# Patient Record
Sex: FEMALE | Race: WHITE | NOT HISPANIC OR LATINO | Employment: OTHER | URBAN - METROPOLITAN AREA
[De-identification: names, ages, dates, MRNs, and addresses within clinical notes are randomized per-mention and may not be internally consistent; named-entity substitution may affect disease eponyms.]

---

## 2017-01-10 RX ORDER — ESTRADIOL 1 MG/1
TABLET ORAL
Qty: 90 TABLET | Refills: 0 | Status: SHIPPED | OUTPATIENT
Start: 2017-01-10 | End: 2017-04-06 | Stop reason: SDUPTHER

## 2017-01-17 RX ORDER — DICYCLOMINE HYDROCHLORIDE 20 MG/1
TABLET ORAL
Qty: 120 TABLET | Refills: 1 | Status: SHIPPED | OUTPATIENT
Start: 2017-01-17 | End: 2017-03-21 | Stop reason: SDUPTHER

## 2017-02-10 RX ORDER — ALPRAZOLAM 1 MG/1
TABLET ORAL
Qty: 90 TABLET | Refills: 5 | Status: SHIPPED | OUTPATIENT
Start: 2017-02-10 | End: 2017-08-09 | Stop reason: SDUPTHER

## 2017-02-25 RX ORDER — MONTELUKAST SODIUM 10 MG/1
TABLET ORAL
Qty: 90 TABLET | Refills: 3 | Status: SHIPPED | OUTPATIENT
Start: 2017-02-25 | End: 2018-02-23 | Stop reason: SDUPTHER

## 2017-03-08 RX ORDER — LEVOTHYROXINE SODIUM 125 UG/1
TABLET ORAL
Qty: 90 TABLET | Refills: 3 | Status: SHIPPED | OUTPATIENT
Start: 2017-03-08 | End: 2018-02-23 | Stop reason: SDUPTHER

## 2017-03-10 ENCOUNTER — OFFICE VISIT (OUTPATIENT)
Dept: FAMILY MEDICINE | Facility: CLINIC | Age: 82
End: 2017-03-10
Payer: MEDICARE

## 2017-03-10 VITALS
TEMPERATURE: 99 F | WEIGHT: 262.81 LBS | BODY MASS INDEX: 44.87 KG/M2 | HEIGHT: 64 IN | HEART RATE: 98 BPM | SYSTOLIC BLOOD PRESSURE: 114 MMHG | RESPIRATION RATE: 18 BRPM | DIASTOLIC BLOOD PRESSURE: 76 MMHG | OXYGEN SATURATION: 96 %

## 2017-03-10 DIAGNOSIS — E07.9 THYROID DISEASE: ICD-10-CM

## 2017-03-10 DIAGNOSIS — K21.9 GASTROESOPHAGEAL REFLUX DISEASE, ESOPHAGITIS PRESENCE NOT SPECIFIED: ICD-10-CM

## 2017-03-10 DIAGNOSIS — R60.0 BILATERAL EDEMA OF LOWER EXTREMITY: Primary | ICD-10-CM

## 2017-03-10 PROCEDURE — 1160F RVW MEDS BY RX/DR IN RCRD: CPT | Mod: S$GLB,,, | Performed by: FAMILY MEDICINE

## 2017-03-10 PROCEDURE — 1125F AMNT PAIN NOTED PAIN PRSNT: CPT | Mod: S$GLB,,, | Performed by: FAMILY MEDICINE

## 2017-03-10 PROCEDURE — 1157F ADVNC CARE PLAN IN RCRD: CPT | Mod: S$GLB,,, | Performed by: FAMILY MEDICINE

## 2017-03-10 PROCEDURE — 99499 UNLISTED E&M SERVICE: CPT | Mod: S$GLB,,, | Performed by: FAMILY MEDICINE

## 2017-03-10 PROCEDURE — 1159F MED LIST DOCD IN RCRD: CPT | Mod: S$GLB,,, | Performed by: FAMILY MEDICINE

## 2017-03-10 PROCEDURE — 99213 OFFICE O/P EST LOW 20 MIN: CPT | Mod: S$GLB,,, | Performed by: FAMILY MEDICINE

## 2017-03-10 RX ORDER — FUROSEMIDE 40 MG/1
TABLET ORAL
Qty: 90 TABLET | Refills: 1 | Status: SHIPPED | OUTPATIENT
Start: 2017-03-10 | End: 2018-03-02 | Stop reason: SDUPTHER

## 2017-03-11 PROBLEM — R60.0 BILATERAL EDEMA OF LOWER EXTREMITY: Status: ACTIVE | Noted: 2017-03-11

## 2017-03-11 PROBLEM — K21.9 GASTROESOPHAGEAL REFLUX DISEASE: Status: ACTIVE | Noted: 2017-03-11

## 2017-03-11 NOTE — PROGRESS NOTES
Patient ID: Shilpa Christopher is a 81 y.o. female.    Chief Complaint: Foot Swelling (swelling in feet)    HPI      Shilpa Christopher is a 81 y.o. female here bc swelling in her feet bilaterally.  No co of sob or chest pain.  Pt has been cutting down on fluid intake as we discussed at last visit.  She states that her legs are more edematous in the morning than in the evening.  Patient with no cough cold or congestion.  No PND or orthopnea.  Complains that pain in her legs bilaterally with being touched.    Reflux stable as well as thyroid disease          Review of Symptoms    Constitutional  some decrease in activity, No chills /fever   Resp  Neg hemoptysis, stridor, choking  CVS  Neg chest pain, palpitations    Physical Exam    Constitutional:  Oriented to person, place, and time.appears well-developed and well-nourished.  No distress.      HENT  Head: Normocephalic and atraumatic  Right Ear: External ear normal.   Left Ear: External ear normal.   Nose: External nose normal.   Mouth:  Moist mucus membranes.    Eyes:  Conjunctivae are normal. Right eye exhibits no discharge.  Left eye exhibits no discharge. No scleral icterus.  No periorbital edema    Cardiovascular:  Regular rate, Regular rhythm     No extrasystole  Normal S1-S2      Pulmonary/Chest:   Crackles at the base of the lungs bilaterally.    Musculoskeletal:    Edema bilateral legs tender to palpation 3+   No obvious deformity No waisting       Neurological:  Alert and oriented to person, place, and time.   Coordination normal.     Skin:   Skin is warm and dry.  No diaphoresis.   No rash noted.     Psychiatric: Normal mood and affect. Behavior is normal.  Judgment and thought content normal.       Assessment / Plan:      ICD-10-CM ICD-9-CM   1. Bilateral edema of lower extremity R60.0 782.3   2. Gastroesophageal reflux disease, esophagitis presence not specified K21.9 530.81   3. Thyroid disease E07.9 246.9     Bilateral edema of lower  extremity    Gastroesophageal reflux disease, esophagitis presence not specified    Thyroid disease    Other orders  -     furosemide (LASIX) 40 MG tablet; One po up to two times a day ok to take two in am with fluid gain  Dispense: 90 tablet; Refill: 1    Reduce sodium intake-increase Lasix to 80 mg in a.m. 40 mg in the p.m. for 1 week then go back to 40 mg twice a day

## 2017-03-14 RX ORDER — ALBUTEROL SULFATE 90 UG/1
AEROSOL, METERED RESPIRATORY (INHALATION)
Qty: 1 EACH | Refills: 11 | Status: SHIPPED | OUTPATIENT
Start: 2017-03-14 | End: 2017-08-09 | Stop reason: SDUPTHER

## 2017-03-21 RX ORDER — DICYCLOMINE HYDROCHLORIDE 20 MG/1
TABLET ORAL
Qty: 120 TABLET | Refills: 1 | Status: SHIPPED | OUTPATIENT
Start: 2017-03-21 | End: 2017-05-15 | Stop reason: SDUPTHER

## 2017-04-09 RX ORDER — ESTRADIOL 1 MG/1
TABLET ORAL
Qty: 90 TABLET | Refills: 0 | Status: SHIPPED | OUTPATIENT
Start: 2017-04-09 | End: 2017-07-07 | Stop reason: SDUPTHER

## 2017-05-08 RX ORDER — MECLIZINE HYDROCHLORIDE 25 MG/1
TABLET ORAL
Qty: 60 TABLET | Refills: 1 | Status: SHIPPED | OUTPATIENT
Start: 2017-05-08 | End: 2018-08-07 | Stop reason: SDUPTHER

## 2017-05-15 RX ORDER — DICYCLOMINE HYDROCHLORIDE 20 MG/1
TABLET ORAL
Qty: 120 TABLET | Refills: 1 | Status: SHIPPED | OUTPATIENT
Start: 2017-05-15 | End: 2017-07-18 | Stop reason: SDUPTHER

## 2017-05-25 RX ORDER — OMEPRAZOLE 20 MG/1
CAPSULE, DELAYED RELEASE ORAL
Qty: 90 CAPSULE | Refills: 3 | Status: SHIPPED | OUTPATIENT
Start: 2017-05-25 | End: 2018-05-25 | Stop reason: SDUPTHER

## 2017-06-27 RX ORDER — FLUTICASONE PROPIONATE AND SALMETEROL 50; 250 UG/1; UG/1
POWDER RESPIRATORY (INHALATION)
Qty: 1 EACH | Refills: 12 | Status: SHIPPED | OUTPATIENT
Start: 2017-06-27 | End: 2018-07-05 | Stop reason: SDUPTHER

## 2017-07-09 RX ORDER — ESTRADIOL 1 MG/1
TABLET ORAL
Qty: 90 TABLET | Refills: 0 | Status: SHIPPED | OUTPATIENT
Start: 2017-07-09 | End: 2017-12-19

## 2017-07-18 RX ORDER — DICYCLOMINE HYDROCHLORIDE 20 MG/1
TABLET ORAL
Qty: 120 TABLET | Refills: 1 | Status: SHIPPED | OUTPATIENT
Start: 2017-07-18 | End: 2017-09-13 | Stop reason: SDUPTHER

## 2017-08-09 ENCOUNTER — OFFICE VISIT (OUTPATIENT)
Dept: FAMILY MEDICINE | Facility: CLINIC | Age: 82
End: 2017-08-09
Payer: MEDICARE

## 2017-08-09 ENCOUNTER — TELEPHONE (OUTPATIENT)
Dept: FAMILY MEDICINE | Facility: CLINIC | Age: 82
End: 2017-08-09

## 2017-08-09 VITALS
HEART RATE: 95 BPM | SYSTOLIC BLOOD PRESSURE: 128 MMHG | OXYGEN SATURATION: 97 % | HEIGHT: 64 IN | TEMPERATURE: 98 F | BODY MASS INDEX: 43.99 KG/M2 | WEIGHT: 257.69 LBS | DIASTOLIC BLOOD PRESSURE: 80 MMHG

## 2017-08-09 DIAGNOSIS — L03.116 CELLULITIS OF LEFT LEG: Primary | ICD-10-CM

## 2017-08-09 PROCEDURE — 1159F MED LIST DOCD IN RCRD: CPT | Mod: S$GLB,,, | Performed by: FAMILY MEDICINE

## 2017-08-09 PROCEDURE — 1126F AMNT PAIN NOTED NONE PRSNT: CPT | Mod: S$GLB,,, | Performed by: FAMILY MEDICINE

## 2017-08-09 PROCEDURE — 99213 OFFICE O/P EST LOW 20 MIN: CPT | Mod: S$GLB,,, | Performed by: FAMILY MEDICINE

## 2017-08-09 PROCEDURE — 3008F BODY MASS INDEX DOCD: CPT | Mod: S$GLB,,, | Performed by: FAMILY MEDICINE

## 2017-08-09 RX ORDER — SULFAMETHOXAZOLE AND TRIMETHOPRIM 800; 160 MG/1; MG/1
1 TABLET ORAL 2 TIMES DAILY
Qty: 20 TABLET | Refills: 0 | Status: SHIPPED | OUTPATIENT
Start: 2017-08-09 | End: 2017-08-21

## 2017-08-09 RX ORDER — ALPRAZOLAM 1 MG/1
TABLET ORAL
Qty: 90 TABLET | Refills: 5 | Status: SHIPPED | OUTPATIENT
Start: 2017-08-09 | End: 2018-03-02 | Stop reason: SDUPTHER

## 2017-08-09 NOTE — TELEPHONE ENCOUNTER
----- Message from Sadie Fagan sent at 8/9/2017  8:58 AM CDT -----  Contact: Pt [phone]  Patient requesting appointment with Dr. Hernandez as soon as possible . Patient states she has a itchy rash on her leg. Patient states it itches so bad, she can't sleep at night.  Please advise

## 2017-08-09 NOTE — TELEPHONE ENCOUNTER
----- Message from Georgina Garcia sent at 8/9/2017  2:43 PM CDT -----  Contact: Zac @ [facility]  Patient is requesting a medication refill. Patient at [facility]; they didn't recvd the Rx as discussed at appt today    RX name: alprazolam (XANAX) 1 MG tablet  Strength:   Quantity: 90 day with refills   Directions:TAKE 1 TABLET BY MOUTH THREE TIMES A DAY      Pharmacy name: [email or web address]

## 2017-08-09 NOTE — TELEPHONE ENCOUNTER
ms esquivel   Already has an appt with dr harris   On Friday august 11 at 840    Patient says the Rash has been off and on since her last visit  She started taking lasix since her last visit with you.  She took BID yesterday - she said she does not have any fluid build up. She is using the bathroom a lot when she takes the lasix  She said both legs are affected, one more then the other.  One leg is hard and red and itching and warm to touch  The other is pink and getting worse  Not oozing, no fever.  She tried benadryl cream, alcohol.    I think she needs to be seen today  Please advise

## 2017-08-14 NOTE — PROGRESS NOTES
Patient ID: Shilpa Christopher is a 81 y.o. female.    Chief Complaint: Leg Swelling (left leg swelling and redness)    HPI       Shilpa Christopher is a 81 y.o. female here because of left leg swelling with redness mild pain itching and irritation.  She has a history of lymphedema and follow-up past few days has had size slightly with erythema.  No fever chills nausea vomiting diarrhea.  Use of peroxide and some multiple skin products has helped relieve some of the itching.      Review of Symptoms    Constitutional  slight change in activity due to pain and swelling, No chills fever   Resp  Neg hemoptysis, stridor, choking  CVS  Neg chest pain, palpitations    Physical Exam    Constitutional:   Oriented to person, place, and time.appears well-developed and well-nourished.   No distress.     HENT  Head: Normocephalic and atraumatic  Right Ear: External ear normal.   Left Ear: External ear normal.   Nose: External nose normal.   Mouth: Moist mucous membranes    Eyes:   Conjunctivae are normal. Right eye exhibits no discharge. Left eye exhibits no discharge. No scleral icterus. No periorbital edema    Musculoskeletal:  No edema. No obvious deformity No waisting     Neurological:  Alert and oriented to person, place, and time. Coordination normal.     Skin:   Left leg slightly swollen mild erythema anterior approximately 14 cm x 6 cm slightly tender anterior.  No diaphoresis.       Psychiatric: Normal mood and affect. Behavior is normal. Judgment and thought content normal.       Assessment / Plan:      ICD-10-CM ICD-9-CM   1. Cellulitis of left leg L03.116 682.6     Cellulitis of left leg    Other orders  -     sulfamethoxazole-trimethoprim 800-160mg (BACTRIM DS) 800-160 mg Tab; Take 1 tablet by mouth 2 (two) times daily.  Dispense: 20 tablet; Refill: 0     antibiotics elevate-come to the office for reevaluation in approximately 2 days

## 2017-08-15 ENCOUNTER — OFFICE VISIT (OUTPATIENT)
Dept: FAMILY MEDICINE | Facility: CLINIC | Age: 82
End: 2017-08-15
Payer: MEDICARE

## 2017-08-15 ENCOUNTER — TELEPHONE (OUTPATIENT)
Dept: FAMILY MEDICINE | Facility: CLINIC | Age: 82
End: 2017-08-15

## 2017-08-15 VITALS
WEIGHT: 259.06 LBS | BODY MASS INDEX: 44.23 KG/M2 | HEIGHT: 64 IN | TEMPERATURE: 99 F | OXYGEN SATURATION: 96 % | DIASTOLIC BLOOD PRESSURE: 88 MMHG | SYSTOLIC BLOOD PRESSURE: 136 MMHG | HEART RATE: 99 BPM

## 2017-08-15 DIAGNOSIS — L03.116 CELLULITIS OF LEFT LOWER EXTREMITY: Primary | ICD-10-CM

## 2017-08-15 PROCEDURE — 1159F MED LIST DOCD IN RCRD: CPT | Mod: S$GLB,,, | Performed by: FAMILY MEDICINE

## 2017-08-15 PROCEDURE — 99213 OFFICE O/P EST LOW 20 MIN: CPT | Mod: 25,S$GLB,, | Performed by: FAMILY MEDICINE

## 2017-08-15 PROCEDURE — 96372 THER/PROPH/DIAG INJ SC/IM: CPT | Mod: S$GLB,,, | Performed by: FAMILY MEDICINE

## 2017-08-15 PROCEDURE — 1125F AMNT PAIN NOTED PAIN PRSNT: CPT | Mod: S$GLB,,, | Performed by: FAMILY MEDICINE

## 2017-08-15 PROCEDURE — 3008F BODY MASS INDEX DOCD: CPT | Mod: S$GLB,,, | Performed by: FAMILY MEDICINE

## 2017-08-15 RX ORDER — CLINDAMYCIN HYDROCHLORIDE 300 MG/1
300 CAPSULE ORAL 3 TIMES DAILY
Qty: 21 CAPSULE | Refills: 0 | Status: SHIPPED | OUTPATIENT
Start: 2017-08-15 | End: 2017-08-22 | Stop reason: SDUPTHER

## 2017-08-15 RX ORDER — GENTAMICIN SULFATE 40 MG/ML
80 INJECTION, SOLUTION INTRAMUSCULAR; INTRAVENOUS
Status: COMPLETED | OUTPATIENT
Start: 2017-08-15 | End: 2017-08-15

## 2017-08-15 RX ADMIN — GENTAMICIN SULFATE 80 MG: 40 INJECTION, SOLUTION INTRAMUSCULAR; INTRAVENOUS at 04:08

## 2017-08-15 NOTE — TELEPHONE ENCOUNTER
----- Message from Sadie Fagan sent at 8/15/2017 10:29 AM CDT -----  Contact: Pt [phone]  Patient requesting appointment today with Dr. Kahn.      Symptoms: Leg pain due to cellulitis     How long: 3 days

## 2017-08-15 NOTE — PROGRESS NOTES
Patient ID: Shilpa Christopher is a 81 y.o. female.    Chief Complaint: Leg Problem (left leg pain, redness, and itching)    HPI      Shilpa Christopher is a 81 y.o. female continues to clinic complaining of left leg pain redness and feeling of itching.  Was seen in the clinic for erythematous slightly edematous left leg with above symptoms.  Not getting better on antibiotics and elevating.  No fever chills nausea vomiting diarrhea            Review of Symptoms    Constitutional  Neg activity change, No chills /fever   Resp  Neg hemoptysis, stridor, choking  CVS  Neg chest pain, palpitations    Physical Exam    Constitutional:  Oriented to person, place, and time.appears well-developed and well-nourished.  No distress.      HENT  Head: Normocephalic and atraumatic  Right Ear: External ear normal.   Left Ear: External ear normal.   Nose: External nose normal.   Mouth:  Moist mucus membranes.    Eyes:  Conjunctivae are normal. Right eye exhibits no discharge.  Left eye exhibits no discharge. No scleral icterus.  No periorbital edema             Neurological:  Alert and oriented to person, place, and time.   Coordination normal.     Skin:   Skin is warm and dry.  No diaphoresis.   No rash noted.  Except left lower extremity  Left leg anterior shin erythematous slightly tender blanchable to touch.    Psychiatric: Normal mood and affect. Behavior is normal.  Judgment and thought content normal.       Assessment / Plan:    No diagnosis found.  There are no diagnoses linked to this encounter.

## 2017-08-16 ENCOUNTER — TELEPHONE (OUTPATIENT)
Dept: FAMILY MEDICINE | Facility: CLINIC | Age: 82
End: 2017-08-16

## 2017-08-16 RX ORDER — DOXEPIN HYDROCHLORIDE 10 MG/1
CAPSULE ORAL
Qty: 30 CAPSULE | Refills: 0 | Status: SHIPPED | OUTPATIENT
Start: 2017-08-16 | End: 2019-03-06

## 2017-08-16 NOTE — TELEPHONE ENCOUNTER
"----- Message from Georgina Garcia sent at 8/16/2017 10:56 AM CDT -----  Contact: daughter-Mack  Daughter calling on mother's behalf  Patient experiencing "severe" itching in left leg due to cellulitis. Wants to know what to do? Can something be called in? Patient was just seen on yesterday & doesn't know if mother mentioned cellulitis to doctor at appointment  "

## 2017-08-16 NOTE — TELEPHONE ENCOUNTER
See message below  I spoke with the daughter  She was woken up this am with the itching  Ankle to the knee  She applied benadryl cream  And is elevating it - soaking it in ice which helps the itching  I advised not to soak it in ice all day   I told her she can do it for 20 min at a time a couple times a day  She said she cant take benadryl bc she takes xanax TID and it did not agree with her last time she took it    The itching is only on the leg no where else    Please advise

## 2017-08-21 ENCOUNTER — OFFICE VISIT (OUTPATIENT)
Dept: FAMILY MEDICINE | Facility: CLINIC | Age: 82
End: 2017-08-21
Payer: MEDICARE

## 2017-08-21 VITALS
HEIGHT: 64 IN | BODY MASS INDEX: 44.18 KG/M2 | HEART RATE: 107 BPM | OXYGEN SATURATION: 96 % | WEIGHT: 258.81 LBS | SYSTOLIC BLOOD PRESSURE: 148 MMHG | DIASTOLIC BLOOD PRESSURE: 70 MMHG

## 2017-08-21 DIAGNOSIS — L03.90 CELLULITIS, UNSPECIFIED CELLULITIS SITE: Primary | ICD-10-CM

## 2017-08-21 PROCEDURE — 1159F MED LIST DOCD IN RCRD: CPT | Mod: S$GLB,,, | Performed by: FAMILY MEDICINE

## 2017-08-21 PROCEDURE — 1125F AMNT PAIN NOTED PAIN PRSNT: CPT | Mod: S$GLB,,, | Performed by: FAMILY MEDICINE

## 2017-08-21 PROCEDURE — 3008F BODY MASS INDEX DOCD: CPT | Mod: S$GLB,,, | Performed by: FAMILY MEDICINE

## 2017-08-21 PROCEDURE — 99213 OFFICE O/P EST LOW 20 MIN: CPT | Mod: S$GLB,,, | Performed by: FAMILY MEDICINE

## 2017-08-22 RX ORDER — CLINDAMYCIN HYDROCHLORIDE 300 MG/1
CAPSULE ORAL
Qty: 21 CAPSULE | Refills: 0 | Status: SHIPPED | OUTPATIENT
Start: 2017-08-22 | End: 2017-09-27

## 2017-08-24 ENCOUNTER — TELEPHONE (OUTPATIENT)
Dept: FAMILY MEDICINE | Facility: CLINIC | Age: 82
End: 2017-08-24

## 2017-08-25 ENCOUNTER — TELEPHONE (OUTPATIENT)
Dept: FAMILY MEDICINE | Facility: CLINIC | Age: 82
End: 2017-08-25

## 2017-08-25 RX ORDER — PREDNISONE 5 MG/1
TABLET ORAL
Qty: 20 TABLET | Refills: 0 | Status: SHIPPED | OUTPATIENT
Start: 2017-08-25 | End: 2017-12-19

## 2017-08-25 NOTE — TELEPHONE ENCOUNTER
I discussed care with her and her daughter    Probably some type of inflammation/phlebitis-many the medicines she cannot tolerate elevate topical preparations fluid pills

## 2017-08-25 NOTE — TELEPHONE ENCOUNTER
----- Message from Georgina Garcia sent at 8/25/2017 10:48 AM CDT -----  Contact: son Benedict  Patient would like to speak with Dr Lakhani  Started medication prescribed this morning. Patient blood pressure 180/70 ; dry mouth; dizzy & lethargic

## 2017-08-28 NOTE — PROGRESS NOTES
Patient ID: Shilpa Christopher is a 81 y.o. female.    Chief Complaint: Recurrent Skin Infections (itching)    HPI       Shilpa Christopher is a 81 y.o. female co of pain and redness of left leg.  Not as bad as it has been. Itching is controled with  otc preparations.        Review of Symptoms    Constitutional  No change in activity, No chills fever   Resp  Neg hemoptysis, stridor, choking  CVS  Neg chest pain, palpitations    Physical Exam    Constitutional:   Oriented to person, place, and time.appears well-developed and well-nourished.   No distress.     HENT  Head: Normocephalic and atraumatic  Right Ear: External ear normal.   Left Ear: External ear normal.   Nose: External nose normal.   Mouth: Moist mucous membranes    Eyes:   Conjunctivae are normal. Right eye exhibits no discharge. Left eye exhibits no discharge. No scleral icterus. No periorbital edema    Musculoskeletal:  No edema. No obvious deformity No waisting     Neurological:  Alert and oriented to person, place, and time. Coordination normal.     Skin:   Skin is warm and dry. Much less inflammation and erythema No diaphoresis.   No rash noted.     Psychiatric: Normal mood and affect. Behavior is normal. Judgment and thought content normal.       Assessment / Plan:      ICD-10-CM ICD-9-CM   1. Cellulitis, unspecified cellulitis site L03.90 682.9     Cellulitis, unspecified cellulitis site    Other orders  -     predniSONE (DELTASONE) 5 MG tablet; One or two daily for inflamation  Dispense: 20 tablet; Refill: 0      May be phlebitis   Try above if able and needed.

## 2017-09-07 RX ORDER — ALPRAZOLAM 1 MG/1
TABLET ORAL
Qty: 90 TABLET | Refills: 5 | Status: SHIPPED | OUTPATIENT
Start: 2017-09-07 | End: 2017-12-19

## 2017-09-13 RX ORDER — DICYCLOMINE HYDROCHLORIDE 20 MG/1
TABLET ORAL
Qty: 120 TABLET | Refills: 1 | Status: SHIPPED | OUTPATIENT
Start: 2017-09-13 | End: 2017-11-14 | Stop reason: SDUPTHER

## 2017-09-27 ENCOUNTER — TELEPHONE (OUTPATIENT)
Dept: FAMILY MEDICINE | Facility: CLINIC | Age: 82
End: 2017-09-27

## 2017-09-27 RX ORDER — SULFAMETHOXAZOLE AND TRIMETHOPRIM 800; 160 MG/1; MG/1
1 TABLET ORAL 2 TIMES DAILY
Qty: 10 TABLET | Refills: 0 | Status: SHIPPED | OUTPATIENT
Start: 2017-09-27 | End: 2017-12-19

## 2017-09-27 RX ORDER — PHENAZOPYRIDINE HYDROCHLORIDE 200 MG/1
200 TABLET, FILM COATED ORAL 3 TIMES DAILY PRN
Qty: 15 TABLET | Refills: 0 | Status: SHIPPED | OUTPATIENT
Start: 2017-09-27 | End: 2017-10-07

## 2017-09-27 NOTE — TELEPHONE ENCOUNTER
----- Message from Georgina Garcia sent at 9/27/2017  9:56 AM CDT -----  Patient said she is to old to be going to a doctor. She knows what she has. UTI  Symptom of frequent urination with little urine. Painful urination  Patient request Rx for Bactrum & Peridium    [facility]

## 2017-10-09 RX ORDER — IPRATROPIUM BROMIDE 17 UG/1
AEROSOL, METERED RESPIRATORY (INHALATION)
Qty: 38.7 G | Refills: 3 | Status: SHIPPED | OUTPATIENT
Start: 2017-10-09 | End: 2018-07-05

## 2017-11-14 RX ORDER — DICYCLOMINE HYDROCHLORIDE 20 MG/1
TABLET ORAL
Qty: 120 TABLET | Refills: 1 | Status: SHIPPED | OUTPATIENT
Start: 2017-11-14 | End: 2018-01-15 | Stop reason: SDUPTHER

## 2017-12-19 ENCOUNTER — OFFICE VISIT (OUTPATIENT)
Dept: FAMILY MEDICINE | Facility: CLINIC | Age: 82
End: 2017-12-19
Payer: MEDICARE

## 2017-12-19 VITALS
WEIGHT: 258.81 LBS | OXYGEN SATURATION: 94 % | HEIGHT: 64 IN | BODY MASS INDEX: 44.18 KG/M2 | TEMPERATURE: 98 F | HEART RATE: 101 BPM | SYSTOLIC BLOOD PRESSURE: 132 MMHG | DIASTOLIC BLOOD PRESSURE: 66 MMHG

## 2017-12-19 DIAGNOSIS — J40 BRONCHITIS: Primary | ICD-10-CM

## 2017-12-19 DIAGNOSIS — J44.0 COPD (CHRONIC OBSTRUCTIVE PULMONARY DISEASE) WITH ACUTE BRONCHITIS: ICD-10-CM

## 2017-12-19 DIAGNOSIS — J20.9 COPD (CHRONIC OBSTRUCTIVE PULMONARY DISEASE) WITH ACUTE BRONCHITIS: ICD-10-CM

## 2017-12-19 PROCEDURE — 99499 UNLISTED E&M SERVICE: CPT | Mod: S$GLB,,, | Performed by: FAMILY MEDICINE

## 2017-12-19 PROCEDURE — 99214 OFFICE O/P EST MOD 30 MIN: CPT | Mod: S$GLB,,, | Performed by: FAMILY MEDICINE

## 2017-12-19 RX ORDER — SULFAMETHOXAZOLE AND TRIMETHOPRIM 800; 160 MG/1; MG/1
1 TABLET ORAL 2 TIMES DAILY
Qty: 14 TABLET | Refills: 0 | Status: SHIPPED | OUTPATIENT
Start: 2017-12-19 | End: 2017-12-26

## 2017-12-19 RX ORDER — IPRATROPIUM BROMIDE 0.5 MG/2.5ML
500 SOLUTION RESPIRATORY (INHALATION) 4 TIMES DAILY
Qty: 120 VIAL | Refills: 1 | Status: SHIPPED | OUTPATIENT
Start: 2017-12-19 | End: 2018-04-02 | Stop reason: SDUPTHER

## 2017-12-19 RX ORDER — ALBUTEROL SULFATE 0.83 MG/ML
2.5 SOLUTION RESPIRATORY (INHALATION) EVERY 6 HOURS PRN
Qty: 120 EACH | Refills: 2 | Status: SHIPPED | OUTPATIENT
Start: 2017-12-19 | End: 2018-11-26 | Stop reason: SDUPTHER

## 2017-12-20 RX ORDER — GENTAMICIN SULFATE 40 MG/ML
80 INJECTION, SOLUTION INTRAMUSCULAR; INTRAVENOUS
Status: CANCELLED | OUTPATIENT
Start: 2017-12-19

## 2017-12-22 ENCOUNTER — TELEPHONE (OUTPATIENT)
Dept: FAMILY MEDICINE | Facility: CLINIC | Age: 82
End: 2017-12-22

## 2017-12-22 PROCEDURE — 96372 THER/PROPH/DIAG INJ SC/IM: CPT | Mod: S$GLB,,, | Performed by: FAMILY MEDICINE

## 2017-12-22 RX ORDER — GENTAMICIN SULFATE 40 MG/ML
80 INJECTION, SOLUTION INTRAMUSCULAR; INTRAVENOUS
Status: COMPLETED | OUTPATIENT
Start: 2017-12-19 | End: 2017-12-22

## 2017-12-22 RX ADMIN — GENTAMICIN SULFATE 80 MG: 40 INJECTION, SOLUTION INTRAMUSCULAR; INTRAVENOUS at 12:12

## 2017-12-22 NOTE — TELEPHONE ENCOUNTER
I spoke with the pt    No fever today - she ran fever for 2 days but none today  Coughing a lot - worse at night - c/o not resting  Not taking any rx cough meds  Still taking abx    She has   Safetussin DM OTC - at home   But her daughter said she cant take that with all her other meds      Please advise  Please call

## 2017-12-22 NOTE — TELEPHONE ENCOUNTER
----- Message from Georgina Garcia sent at 12/22/2017  1:30 PM CST -----  Patient saw doctor this week. Says she is not better. Feels like bronchitis

## 2017-12-29 NOTE — PROGRESS NOTES
Patient ID: Shilpa Christopher is a 82 y.o. female.    Chief Complaint: Cough; Nasal Congestion; and Fever    HPI       Shilpa Christopher is a 82 y.o. female few day history of nasal congestion low-grade fever and wheezing.  She is using nebulizer treatments approximately 4 times a day.  Helping slightly.  No nausea vomiting or diarrhea at this point      Review of Symptoms    Constitutional  some activity change due to illness, slight chills and low-grade increased temperature  Resp  Neg hemoptysis, stridor, choking  CVS  Neg chest pain, palpitations    Physical Exam    Constitutional:   Oriented to person, place, and time.appears well-developed and well-nourished.   No distress.     HENT:   Head: Normocephalic and atraumatic.     Right Ear: Tympanic membrane, external ear and ear canal normal     Left Ear: Tympanic membrane, external ear and ear canal normal    Nose: External Normal. Normal turbinates, No rhinorrhea     Mouth/Throat: Uvula is midline, oropharynx is clear and moist and mucous membranes are normal.     Neck: supple no anterior cervical adenopathy    Carotid artery:  Bruit    NEG []   POS[]    Eyes:   Conjunctivae are normal. Right eye exhibits no discharge. Left eye exhibits no discharge. No scleral icterus. No periorbital edema       Cardiovascular:  Regular rate, Regular rhythm     No extrasystole  Normal S1-S2    Pulmonary/Chest:   Normal effort with some respiratory wheezing and occasional rhonchi.      Musculoskeletal:  No edema. No obvious deformity No wasting     Neurological:   Alert and oriented to person, place, and time. Coordination normal.     Skin:   Skin is warm and dry.   No diaphoresis.   No rash noted.    Psychiatric: Normal mood and affect. Behavior is normal. Judgment and thought content normal.       Assessment / Plan:      ICD-10-CM ICD-9-CM   1. Bronchitis J40 490   2. COPD (chronic obstructive pulmonary disease) with acute bronchitis J44.0 491.22    J20.9      Bronchitis  -      gentamicin injection 80 mg; Inject 80 mg into the muscle one time.    COPD (chronic obstructive pulmonary disease) with acute bronchitis  -     gentamicin injection 80 mg; Inject 80 mg into the muscle one time.    Other orders  -     albuterol (PROVENTIL) 2.5 mg /3 mL (0.083 %) nebulizer solution; Take 3 mLs (2.5 mg total) by nebulization every 6 (six) hours as needed for Wheezing. Rescue  Dispense: 120 each; Refill: 2  -     ipratropium (ATROVENT) 0.02 % nebulizer solution; Take 2.5 mLs (500 mcg total) by nebulization 4 (four) times daily. Rescue  Dispense: 120 vial; Refill: 1  -     sulfamethoxazole-trimethoprim 800-160mg (BACTRIM DS) 800-160 mg Tab; Take 1 tablet by mouth 2 (two) times daily.  Dispense: 14 tablet; Refill: 0  -     Cancel: gentamicin injection 80 mg; Inject 80 mg into the muscle one time.     she cannot tolerate many of the medicines used for these symptoms

## 2018-01-15 RX ORDER — DICYCLOMINE HYDROCHLORIDE 20 MG/1
TABLET ORAL
Qty: 120 TABLET | Refills: 1 | Status: SHIPPED | OUTPATIENT
Start: 2018-01-15 | End: 2018-03-15 | Stop reason: SDUPTHER

## 2018-02-05 ENCOUNTER — TELEPHONE (OUTPATIENT)
Dept: FAMILY MEDICINE | Facility: CLINIC | Age: 83
End: 2018-02-05

## 2018-02-05 RX ORDER — SULFAMETHOXAZOLE AND TRIMETHOPRIM 800; 160 MG/1; MG/1
1 TABLET ORAL 2 TIMES DAILY
Qty: 14 TABLET | Refills: 0 | Status: SHIPPED | OUTPATIENT
Start: 2018-02-05 | End: 2018-03-02

## 2018-02-05 RX ORDER — PHENAZOPYRIDINE HYDROCHLORIDE 100 MG/1
100 TABLET, FILM COATED ORAL 3 TIMES DAILY PRN
Qty: 30 TABLET | Refills: 0 | Status: SHIPPED | OUTPATIENT
Start: 2018-02-05 | End: 2018-02-15

## 2018-02-05 NOTE — TELEPHONE ENCOUNTER
----- Message from Georgina Garcia sent at 2/5/2018 10:48 AM CST -----  Patient has bladder infection. Started on Saturday. Taking OTC Azo. Has Rx of bactrum but doesn't have any refills. Pt requesting Rx for both Bactrum & Peridum    [facility]

## 2018-02-23 RX ORDER — LEVOTHYROXINE SODIUM 125 UG/1
TABLET ORAL
Qty: 90 TABLET | Refills: 3 | Status: SHIPPED | OUTPATIENT
Start: 2018-02-23 | End: 2019-02-26 | Stop reason: SDUPTHER

## 2018-02-24 RX ORDER — MONTELUKAST SODIUM 10 MG/1
TABLET ORAL
Qty: 90 TABLET | Refills: 3 | Status: SHIPPED | OUTPATIENT
Start: 2018-02-24 | End: 2019-02-13 | Stop reason: SDUPTHER

## 2018-03-02 ENCOUNTER — TELEPHONE (OUTPATIENT)
Dept: FAMILY MEDICINE | Facility: CLINIC | Age: 83
End: 2018-03-02

## 2018-03-02 ENCOUNTER — OFFICE VISIT (OUTPATIENT)
Dept: FAMILY MEDICINE | Facility: CLINIC | Age: 83
End: 2018-03-02
Payer: MEDICARE

## 2018-03-02 VITALS
TEMPERATURE: 99 F | DIASTOLIC BLOOD PRESSURE: 66 MMHG | BODY MASS INDEX: 45.24 KG/M2 | SYSTOLIC BLOOD PRESSURE: 122 MMHG | OXYGEN SATURATION: 93 % | HEIGHT: 64 IN | HEART RATE: 115 BPM | WEIGHT: 265 LBS

## 2018-03-02 DIAGNOSIS — J20.9 COPD (CHRONIC OBSTRUCTIVE PULMONARY DISEASE) WITH ACUTE BRONCHITIS: ICD-10-CM

## 2018-03-02 DIAGNOSIS — J45.909 ASTHMA, UNSPECIFIED ASTHMA SEVERITY, UNSPECIFIED WHETHER COMPLICATED, UNSPECIFIED WHETHER PERSISTENT: ICD-10-CM

## 2018-03-02 DIAGNOSIS — E66.01 MORBIDLY OBESE: ICD-10-CM

## 2018-03-02 DIAGNOSIS — M72.2 PLANTAR FASCIITIS: ICD-10-CM

## 2018-03-02 DIAGNOSIS — J44.0 COPD (CHRONIC OBSTRUCTIVE PULMONARY DISEASE) WITH ACUTE BRONCHITIS: ICD-10-CM

## 2018-03-02 DIAGNOSIS — R60.0 BILATERAL EDEMA OF LOWER EXTREMITY: Primary | ICD-10-CM

## 2018-03-02 PROCEDURE — 99213 OFFICE O/P EST LOW 20 MIN: CPT | Mod: S$GLB,,, | Performed by: FAMILY MEDICINE

## 2018-03-02 PROCEDURE — 99499 UNLISTED E&M SERVICE: CPT | Mod: S$GLB,,, | Performed by: FAMILY MEDICINE

## 2018-03-02 RX ORDER — ALPRAZOLAM 1 MG/1
1 TABLET ORAL 3 TIMES DAILY
Qty: 90 TABLET | Refills: 5 | Status: SHIPPED | OUTPATIENT
Start: 2018-03-02 | End: 2018-08-29 | Stop reason: SDUPTHER

## 2018-03-02 RX ORDER — SULFAMETHOXAZOLE AND TRIMETHOPRIM 800; 160 MG/1; MG/1
1 TABLET ORAL 2 TIMES DAILY
Qty: 14 TABLET | Refills: 0 | Status: SHIPPED | OUTPATIENT
Start: 2018-03-02 | End: 2018-05-08 | Stop reason: ALTCHOICE

## 2018-03-02 RX ORDER — FUROSEMIDE 40 MG/1
TABLET ORAL
Qty: 90 TABLET | Refills: 1 | Status: SHIPPED | OUTPATIENT
Start: 2018-03-02 | End: 2018-10-31 | Stop reason: SDUPTHER

## 2018-03-02 NOTE — TELEPHONE ENCOUNTER
----- Message from Taylor Pike sent at 3/2/2018  8:59 AM CST -----  Contact: the pt  Patient would like to be seen today. Pt declined appt with Ana.  States this looks serious.  She can be reached at [phone]    Symptoms: Leg swollen/red.  Per the pt, may be cellulitis    How long has patient had these symptoms: Couple days    If unable to be seen , can something be called into patient pharmacy? Really want to see him    Pharmacy name: [facility]    Any drug allergies:

## 2018-03-02 NOTE — PROGRESS NOTES
Subjective:      Patient ID: Shilpa Christopher is a 82 y.o. female.    Chief Complaint: No chief complaint on file.    Legs swollen and red with cellulitis; had this before; still on a fluid pill; took 2 yesterday;, diuressed good; can take bactrim ds; heels hurt;       Review of Systems   Constitutional: Negative.    HENT: Negative.    Respiratory: Positive for cough, shortness of breath and wheezing.    Cardiovascular: Positive for leg swelling.   Gastrointestinal: Negative.    Endocrine: Negative.    Genitourinary: Negative.    Musculoskeletal: Negative.    Skin: Positive for color change.   Psychiatric/Behavioral: Negative.    All other systems reviewed and are negative.    Objective:     Physical Exam   Constitutional: She is oriented to person, place, and time. She appears well-developed and well-nourished.   Obese   HENT:   Head: Normocephalic.   Eyes: Conjunctivae and EOM are normal. Pupils are equal, round, and reactive to light.   Neck: Normal range of motion. Neck supple.   Cardiovascular: Normal rate, regular rhythm and normal heart sounds.    Pulmonary/Chest: She is in respiratory distress. She has wheezes.   Musculoskeletal: Normal range of motion. She exhibits edema and tenderness.   Neurological: She is alert and oriented to person, place, and time. She has normal reflexes.   Skin: Skin is warm and dry. There is erythema.   Psychiatric: She has a normal mood and affect. Her behavior is normal. Judgment and thought content normal.   Nursing note and vitals reviewed.    Assessment:     1. Bilateral edema of lower extremity    2. Asthma, unspecified asthma severity, unspecified whether complicated, unspecified whether persistent    3. Plantar fasciitis    4. Morbidly obese    5. COPD (chronic obstructive pulmonary disease) with acute bronchitis      Plan:     New Prescriptions    No medications on file     Discontinued Medications    No medications on file     Modified Medications    Modified Medication  Previous Medication    ALPRAZOLAM (XANAX) 1 MG TABLET alprazolam (XANAX) 1 MG tablet       Take 1 tablet (1 mg total) by mouth 3 (three) times daily.    TAKE 1 TABLET BY MOUTH THREE TIMES A DAY    FUROSEMIDE (LASIX) 40 MG TABLET furosemide (LASIX) 40 MG tablet       One po up to two times a day ok to take two in am with fluid gain    One po up to two times a day ok to take two in am with fluid gain    SULFAMETHOXAZOLE-TRIMETHOPRIM 800-160MG (BACTRIM DS) 800-160 MG TAB sulfamethoxazole-trimethoprim 800-160mg (BACTRIM DS) 800-160 mg Tab       Take 1 tablet by mouth 2 (two) times daily.    Take 1 tablet by mouth 2 (two) times daily.       Bilateral edema of lower extremity  Comments:  Compression stockings lose weight and avoid salt  Orders:  -     COMPRESSION STOCKINGS    Asthma, unspecified asthma severity, unspecified whether complicated, unspecified whether persistent  Comments:  Use inhalers    Plantar fasciitis  Comments:  Stretch    Morbidly obese  Comments:  Weight reduction diet  Orders:  -     Lipid panel; Future  -     DXA Bone Density Spine And Hip; Future; Expected date: 03/11/2018    COPD (chronic obstructive pulmonary disease) with acute bronchitis  Comments:  Stable use inhalers    Other orders  -     ALPRAZolam (XANAX) 1 MG tablet; Take 1 tablet (1 mg total) by mouth 3 (three) times daily.  Dispense: 90 tablet; Refill: 5  -     furosemide (LASIX) 40 MG tablet; One po up to two times a day ok to take two in am with fluid gain  Dispense: 90 tablet; Refill: 1  -     sulfamethoxazole-trimethoprim 800-160mg (BACTRIM DS) 800-160 mg Tab; Take 1 tablet by mouth 2 (two) times daily.  Dispense: 14 tablet; Refill: 0      Told to stretch for plantar fasciitis

## 2018-03-11 PROBLEM — J20.9 COPD (CHRONIC OBSTRUCTIVE PULMONARY DISEASE) WITH ACUTE BRONCHITIS: Status: ACTIVE | Noted: 2018-03-11

## 2018-03-11 PROBLEM — J44.0 COPD (CHRONIC OBSTRUCTIVE PULMONARY DISEASE) WITH ACUTE BRONCHITIS: Status: ACTIVE | Noted: 2018-03-11

## 2018-03-15 RX ORDER — DICYCLOMINE HYDROCHLORIDE 20 MG/1
TABLET ORAL
Qty: 120 TABLET | Refills: 1 | Status: SHIPPED | OUTPATIENT
Start: 2018-03-15 | End: 2018-05-18 | Stop reason: SDUPTHER

## 2018-04-02 RX ORDER — IPRATROPIUM BROMIDE 0.5 MG/2.5ML
SOLUTION RESPIRATORY (INHALATION)
Qty: 150 ML | Refills: 1 | Status: SHIPPED | OUTPATIENT
Start: 2018-04-02 | End: 2018-07-05 | Stop reason: SDUPTHER

## 2018-04-13 DIAGNOSIS — Z01.419 WELL WOMAN EXAM: Primary | ICD-10-CM

## 2018-04-14 RX ORDER — ALBUTEROL SULFATE 90 UG/1
AEROSOL, METERED RESPIRATORY (INHALATION)
Qty: 1 EACH | Refills: 11 | Status: SHIPPED | OUTPATIENT
Start: 2018-04-14 | End: 2019-03-06

## 2018-05-08 ENCOUNTER — OFFICE VISIT (OUTPATIENT)
Dept: FAMILY MEDICINE | Facility: CLINIC | Age: 83
End: 2018-05-08
Payer: MEDICARE

## 2018-05-08 ENCOUNTER — TELEPHONE (OUTPATIENT)
Dept: FAMILY MEDICINE | Facility: CLINIC | Age: 83
End: 2018-05-08

## 2018-05-08 ENCOUNTER — HOSPITAL ENCOUNTER (OUTPATIENT)
Dept: RADIOLOGY | Facility: HOSPITAL | Age: 83
Discharge: HOME | End: 2018-05-08
Attending: NURSE PRACTITIONER
Payer: MEDICARE

## 2018-05-08 VITALS
OXYGEN SATURATION: 94 % | DIASTOLIC BLOOD PRESSURE: 79 MMHG | SYSTOLIC BLOOD PRESSURE: 149 MMHG | WEIGHT: 258.63 LBS | BODY MASS INDEX: 44.39 KG/M2 | TEMPERATURE: 99 F | RESPIRATION RATE: 15 BRPM | HEART RATE: 124 BPM

## 2018-05-08 DIAGNOSIS — R05.9 COUGH: ICD-10-CM

## 2018-05-08 DIAGNOSIS — J40 BRONCHITIS: Primary | ICD-10-CM

## 2018-05-08 DIAGNOSIS — R50.9 FEVER, UNSPECIFIED FEVER CAUSE: Primary | ICD-10-CM

## 2018-05-08 DIAGNOSIS — R06.02 SOB (SHORTNESS OF BREATH): ICD-10-CM

## 2018-05-08 DIAGNOSIS — R35.0 URINARY FREQUENCY: ICD-10-CM

## 2018-05-08 LAB
BILIRUB SERPL-MCNC: NORMAL MG/DL
BLOOD URINE, POC: NORMAL
COLOR, POC UA: YELLOW
CTP QC/QA: YES
FLUAV AG NPH QL: NEGATIVE
FLUBV AG NPH QL: NEGATIVE
GLUCOSE UR QL STRIP: NORMAL
KETONES UR QL STRIP: NORMAL
LEUKOCYTE ESTERASE URINE, POC: NORMAL
NITRITE, POC UA: NORMAL
PH, POC UA: 7
PROTEIN, POC: NORMAL
SPECIFIC GRAVITY, POC UA: 1
UROBILINOGEN, POC UA: NORMAL

## 2018-05-08 PROCEDURE — 81002 URINALYSIS NONAUTO W/O SCOPE: CPT | Mod: S$GLB,,, | Performed by: NURSE PRACTITIONER

## 2018-05-08 PROCEDURE — 71046 X-RAY EXAM CHEST 2 VIEWS: CPT | Mod: TC,FY,PO

## 2018-05-08 PROCEDURE — 87804 INFLUENZA ASSAY W/OPTIC: CPT | Mod: 59,QW,, | Performed by: NURSE PRACTITIONER

## 2018-05-08 PROCEDURE — 99213 OFFICE O/P EST LOW 20 MIN: CPT | Mod: 25,S$GLB,, | Performed by: NURSE PRACTITIONER

## 2018-05-08 RX ORDER — AZITHROMYCIN 250 MG/1
TABLET, FILM COATED ORAL
Qty: 6 TABLET | Refills: 0 | Status: SHIPPED | OUTPATIENT
Start: 2018-05-08 | End: 2018-05-13

## 2018-05-08 NOTE — PROGRESS NOTES
Subjective:       Patient ID: Shilpa Christopher is a 82 y.o. female.    Chief Complaint: Cough and Fever    Cough   This is a new problem. The current episode started yesterday. The problem has been unchanged. The problem occurs every few minutes. The cough is productive of sputum. Associated symptoms include chills, a fever, shortness of breath and wheezing. Pertinent negatives include no chest pain, ear congestion, ear pain, headaches, heartburn, hemoptysis, myalgias, nasal congestion, postnasal drip, rash, sore throat, sweats or weight loss. Treatments tried: tylenol. The treatment provided no relief. Her past medical history is significant for COPD. There is no history of asthma, bronchiectasis, bronchitis, emphysema, environmental allergies or pneumonia.   Fever    This is a new problem. The current episode started yesterday. The problem occurs constantly. The problem has been unchanged. The maximum temperature noted was 101 to 101.9 F. The temperature was taken using an oral thermometer. Associated symptoms include coughing, diarrhea and wheezing. Pertinent negatives include no abdominal pain, chest pain, congestion, ear pain, headaches, muscle aches, nausea, rash, sleepiness, sore throat, urinary pain or vomiting. Treatments tried: tylenol. The treatment provided no relief.   Risk factors: no contaminated food, no contaminated water, no hx of cancer, no immunosuppression, no occupational exposure, no recent sickness, no recent travel and no sick contacts      Review of Systems   Constitutional: Positive for appetite change, chills, diaphoresis, fatigue and fever. Negative for weight loss.   HENT: Negative for congestion, ear pain, postnasal drip and sore throat.    Respiratory: Positive for cough, shortness of breath and wheezing. Negative for hemoptysis and chest tightness.    Cardiovascular: Negative for chest pain, palpitations and leg swelling.   Gastrointestinal: Positive for diarrhea. Negative for  abdominal pain, heartburn, nausea and vomiting.        Diarrhea yesterday which has subsided today     Genitourinary: Positive for frequency. Negative for dysuria.   Musculoskeletal: Negative for myalgias.   Skin: Negative for rash.   Allergic/Immunologic: Negative for environmental allergies.   Neurological: Negative for headaches.       Objective:      Physical Exam   Constitutional: She is oriented to person, place, and time. She appears well-developed and well-nourished. No distress.   HENT:   Right Ear: Tympanic membrane and external ear normal.   Left Ear: Tympanic membrane and external ear normal.   Nose: No mucosal edema or rhinorrhea. Right sinus exhibits no maxillary sinus tenderness and no frontal sinus tenderness. Left sinus exhibits no maxillary sinus tenderness and no frontal sinus tenderness.   Mouth/Throat: No oropharyngeal exudate, posterior oropharyngeal edema or posterior oropharyngeal erythema.   Cardiovascular: Normal rate, regular rhythm and normal heart sounds.    Pulmonary/Chest: Effort normal. No respiratory distress. She has wheezes in the right upper field, the right middle field, the right lower field, the left upper field, the left middle field and the left lower field.   SOB   Neurological: She is alert and oriented to person, place, and time.   Skin: Skin is warm and dry. She is not diaphoretic.   Psychiatric: She has a normal mood and affect. Her behavior is normal. Judgment and thought content normal.   Vitals reviewed.      Assessment:       1. Fever, unspecified fever cause    2. Urinary frequency    3. SOB (shortness of breath)    4. Cough        Plan:       Fever, unspecified fever cause  -     POCT Influenza A/B  -     CBC auto differential; Future  -     Comprehensive metabolic panel; Future    Urinary frequency  -     POCT URINE DIPSTICK WITHOUT MICROSCOPE    SOB (shortness of breath)  -     X-Ray Chest PA And Lateral; Future    Cough  -     X-Ray Chest PA And Lateral;  Future      Urine clear for infection  Flu swab negative  Xray to rule out pneumonia  Will call today with results

## 2018-05-08 NOTE — TELEPHONE ENCOUNTER
Spoke with patient let her know that her xray was normal most likely bronchitis. Tylenol for the fever and hydrate well call me if anything changes

## 2018-05-18 RX ORDER — DICYCLOMINE HYDROCHLORIDE 20 MG/1
TABLET ORAL
Qty: 120 TABLET | Refills: 1 | Status: SHIPPED | OUTPATIENT
Start: 2018-05-18 | End: 2018-07-15 | Stop reason: SDUPTHER

## 2018-05-25 DIAGNOSIS — E07.9 THYROID DISEASE: ICD-10-CM

## 2018-05-25 DIAGNOSIS — M94.9 DISORDER OF CARTILAGE: ICD-10-CM

## 2018-05-25 DIAGNOSIS — E66.01 MORBIDLY OBESE: ICD-10-CM

## 2018-05-25 DIAGNOSIS — Z00.00 WELLNESS EXAMINATION: Primary | ICD-10-CM

## 2018-05-25 DIAGNOSIS — Z13.820 OSTEOPOROSIS SCREENING: ICD-10-CM

## 2018-05-27 PROBLEM — Z13.820 OSTEOPOROSIS SCREENING: Status: ACTIVE | Noted: 2018-05-27

## 2018-05-27 RX ORDER — OMEPRAZOLE 20 MG/1
CAPSULE, DELAYED RELEASE ORAL
Qty: 90 CAPSULE | Refills: 1 | Status: SHIPPED | OUTPATIENT
Start: 2018-05-27 | End: 2018-10-31 | Stop reason: SDUPTHER

## 2018-05-28 ENCOUNTER — TELEPHONE (OUTPATIENT)
Dept: ADMINISTRATIVE | Facility: HOSPITAL | Age: 83
End: 2018-05-28

## 2018-05-28 NOTE — TELEPHONE ENCOUNTER
Contacted pt to schedule labs and dexa scan.   Scheduled for 05/30/2018 and advised pt to go to pharmacy to have shingles vaccine.   Pt states she would like to have a return phone call regarding most recent labs and chest xray.   Please advise.

## 2018-05-31 ENCOUNTER — HOSPITAL ENCOUNTER (OUTPATIENT)
Dept: RADIOLOGY | Facility: HOSPITAL | Age: 83
Discharge: HOME | End: 2018-05-31
Attending: FAMILY MEDICINE
Payer: MEDICARE

## 2018-05-31 DIAGNOSIS — E66.01 MORBIDLY OBESE: ICD-10-CM

## 2018-05-31 DIAGNOSIS — Z13.820 OSTEOPOROSIS SCREENING: ICD-10-CM

## 2018-05-31 DIAGNOSIS — M94.9 DISORDER OF CARTILAGE: ICD-10-CM

## 2018-05-31 DIAGNOSIS — Z00.00 WELLNESS EXAMINATION: ICD-10-CM

## 2018-05-31 DIAGNOSIS — E07.9 THYROID DISEASE: ICD-10-CM

## 2018-05-31 PROCEDURE — 77080 DXA BONE DENSITY AXIAL: CPT | Mod: TC,PO

## 2018-06-08 ENCOUNTER — OFFICE VISIT (OUTPATIENT)
Dept: FAMILY MEDICINE | Facility: CLINIC | Age: 83
End: 2018-06-08
Payer: MEDICARE

## 2018-06-08 VITALS
BODY MASS INDEX: 43.45 KG/M2 | OXYGEN SATURATION: 94 % | DIASTOLIC BLOOD PRESSURE: 78 MMHG | WEIGHT: 260.81 LBS | HEIGHT: 65 IN | TEMPERATURE: 98 F | SYSTOLIC BLOOD PRESSURE: 132 MMHG | HEART RATE: 100 BPM

## 2018-06-08 DIAGNOSIS — R60.0 LOWER EXTREMITY EDEMA: Primary | ICD-10-CM

## 2018-06-08 PROCEDURE — 99213 OFFICE O/P EST LOW 20 MIN: CPT | Mod: S$GLB,,, | Performed by: FAMILY MEDICINE

## 2018-06-11 NOTE — PROGRESS NOTES
" Patient ID: Shilpa Christopher is a 82 y.o. female.    Chief Complaint: Leg Swelling    HPI       Shilpa Christopher is a 82 y.o. femalecomplaints of right leg S  Swelling I from the ankle and right foot.  Without Fever chills  Without erythema or excruciating pain. Does have some leaking from her skin clear fluid.  This caused her some concern.    Review of Symptoms    Constitutional  No change in activity, No chills fever   Resp  Neg hemoptysis, stridor, choking  CVS  Neg chest pain, palpitations    /78   Pulse 100   Temp 98.2 °F (36.8 °C)   Ht 5' 5" (1.651 m)   Wt 118.3 kg (260 lb 12.8 oz)   SpO2 (!) 94%   BMI 43.40 kg/m²     Physical Exam    Constitutional:   Oriented to person, place, and time.appears well-developed and well-nourished.   No distress.     HENT  Head: Normocephalic and atraumatic  Right Ear: External ear normal.   Left Ear: External ear normal.   Nose: External nose normal.   Mouth: Moist mucous membranes    Eyes:   Conjunctivae are normal. Right eye exhibits no discharge. Left eye exhibits no discharge. No scleral icterus. No periorbital edema    Musculoskeletal:  No edema. No obvious deformity No wasting     Neurological:  Alert and oriented to person, place, and time. Coordination normal.     Skin:   Skin is warm and dry.  No diaphoresis.   No rash noted.     Psychiatric: Normal mood and affect. Behavior is normal. Judgment and thought content normal.       Assessment / Plan:      ICD-10-CM ICD-9-CM   1. Lower extremity edema R60.0 782.3     Lower extremity edema  Comments:  wrap foot and lower leg  elevated        "

## 2018-07-05 RX ORDER — IPRATROPIUM BROMIDE 0.5 MG/2.5ML
SOLUTION RESPIRATORY (INHALATION)
Qty: 150 ML | Refills: 1 | Status: SHIPPED | OUTPATIENT
Start: 2018-07-05 | End: 2018-11-26 | Stop reason: SDUPTHER

## 2018-07-07 RX ORDER — FLUTICASONE PROPIONATE AND SALMETEROL 50; 250 UG/1; UG/1
POWDER RESPIRATORY (INHALATION)
Qty: 1 EACH | Refills: 12 | Status: SHIPPED | OUTPATIENT
Start: 2018-07-07 | End: 2019-07-08 | Stop reason: SDUPTHER

## 2018-07-16 RX ORDER — DICYCLOMINE HYDROCHLORIDE 20 MG/1
TABLET ORAL
Qty: 120 TABLET | Refills: 1 | Status: SHIPPED | OUTPATIENT
Start: 2018-07-16 | End: 2018-09-13 | Stop reason: SDUPTHER

## 2018-08-06 NOTE — TELEPHONE ENCOUNTER
----- Message from Georgina Garcia sent at 8/24/2017 11:18 AM CDT -----  Would like returned call from Dr Lakhani  Says her leg is worse this morning. Thinks the Tylenol may be the cause. Takes about 6 Tylenol daily.  
I spoke with the pt   She iced the legs and itching went away  Feels better now, the problem usually happens at night   She thinks the itching may be from the tylenol  That may have caused liver damage   That may be causing the itching  She has Family history of liver disease  I reassured her that her LFT were drawn on 8/8/17 and they were WNL  I advised stopping the tylenol for a few days and see if it helps  But she does not want to change or stop the tylenol bc of the knee pain  She said she will decrease the amt of tylenol she takes         
Welcome to come see me tomorrow  
direct patient care (not related to procedure)/interpretation of diagnostic studies/consultation with other physicians/consult w/ pt's family directly relating to pts condition/documentation/additional history taking

## 2018-08-07 RX ORDER — MECLIZINE HYDROCHLORIDE 25 MG/1
TABLET ORAL
Qty: 60 TABLET | Refills: 1 | Status: SHIPPED | OUTPATIENT
Start: 2018-08-07

## 2018-08-27 PROBLEM — Z13.820 OSTEOPOROSIS SCREENING: Status: RESOLVED | Noted: 2018-05-27 | Resolved: 2018-08-27

## 2018-08-31 RX ORDER — ALPRAZOLAM 1 MG/1
TABLET ORAL
Qty: 90 TABLET | Refills: 5 | Status: SHIPPED | OUTPATIENT
Start: 2018-08-31 | End: 2018-09-30 | Stop reason: SDUPTHER

## 2018-09-13 RX ORDER — DICYCLOMINE HYDROCHLORIDE 20 MG/1
TABLET ORAL
Qty: 120 TABLET | Refills: 1 | Status: SHIPPED | OUTPATIENT
Start: 2018-09-13 | End: 2018-11-18 | Stop reason: SDUPTHER

## 2018-10-01 RX ORDER — ALPRAZOLAM 1 MG/1
TABLET ORAL
Qty: 90 TABLET | Refills: 0 | Status: SHIPPED | OUTPATIENT
Start: 2018-10-01 | End: 2018-10-31 | Stop reason: SDUPTHER

## 2018-10-18 RX ORDER — IPRATROPIUM BROMIDE 17 UG/1
AEROSOL, METERED RESPIRATORY (INHALATION)
Qty: 1 INHALER | Refills: 3 | Status: SHIPPED | OUTPATIENT
Start: 2018-10-18 | End: 2019-03-19 | Stop reason: SDUPTHER

## 2018-10-31 RX ORDER — ALPRAZOLAM 1 MG/1
TABLET ORAL
Qty: 90 TABLET | Refills: 0 | Status: SHIPPED | OUTPATIENT
Start: 2018-10-31 | End: 2018-12-26 | Stop reason: SDUPTHER

## 2018-10-31 RX ORDER — OMEPRAZOLE 20 MG/1
CAPSULE, DELAYED RELEASE ORAL
Qty: 90 CAPSULE | Refills: 1 | Status: SHIPPED | OUTPATIENT
Start: 2018-10-31 | End: 2019-05-21 | Stop reason: SDUPTHER

## 2018-10-31 RX ORDER — FUROSEMIDE 40 MG/1
TABLET ORAL
Qty: 90 TABLET | Refills: 1 | Status: SHIPPED | OUTPATIENT
Start: 2018-10-31 | End: 2019-04-22 | Stop reason: SDUPTHER

## 2018-10-31 NOTE — TELEPHONE ENCOUNTER
----- Message from Deborah Hernandez sent at 10/31/2018  4:13 PM CDT -----  Contact: Self/ [phone]  Patient called in to get prescription refill. She will be out of her medication tomorrow.     Please call.    omeprazole (PRILOSEC) 20 MG capsule    ALPRAZolam (XANAX) 1 MG tablet    furosemide (LASIX) 40 MG tablet    [id number] [place] - [address] [place] [address]

## 2018-11-18 RX ORDER — DICYCLOMINE HYDROCHLORIDE 20 MG/1
TABLET ORAL
Qty: 120 TABLET | Refills: 1 | Status: SHIPPED | OUTPATIENT
Start: 2018-11-18 | End: 2018-12-18 | Stop reason: SDUPTHER

## 2018-11-26 RX ORDER — ALBUTEROL SULFATE 0.83 MG/ML
SOLUTION RESPIRATORY (INHALATION)
Qty: 360 ML | Refills: 2 | Status: SHIPPED | OUTPATIENT
Start: 2018-11-26

## 2018-11-26 RX ORDER — IPRATROPIUM BROMIDE 0.5 MG/2.5ML
SOLUTION RESPIRATORY (INHALATION)
Qty: 150 ML | Refills: 1 | Status: SHIPPED | OUTPATIENT
Start: 2018-11-26 | End: 2019-03-19

## 2018-12-18 RX ORDER — DICYCLOMINE HYDROCHLORIDE 20 MG/1
20 TABLET ORAL 4 TIMES DAILY
Qty: 120 TABLET | Refills: 1 | Status: SHIPPED | OUTPATIENT
Start: 2018-12-18 | End: 2019-03-06

## 2018-12-27 RX ORDER — ALPRAZOLAM 1 MG/1
TABLET ORAL
Qty: 90 TABLET | Refills: 0 | Status: SHIPPED | OUTPATIENT
Start: 2018-12-27 | End: 2019-01-25 | Stop reason: SDUPTHER

## 2019-01-25 RX ORDER — ALPRAZOLAM 1 MG/1
1 TABLET ORAL 3 TIMES DAILY
Qty: 90 TABLET | Refills: 0 | Status: SHIPPED | OUTPATIENT
Start: 2019-01-25 | End: 2019-02-26 | Stop reason: SDUPTHER

## 2019-01-31 ENCOUNTER — OFFICE VISIT (OUTPATIENT)
Dept: FAMILY MEDICINE | Facility: CLINIC | Age: 84
End: 2019-01-31
Payer: MEDICARE

## 2019-01-31 ENCOUNTER — TELEPHONE (OUTPATIENT)
Dept: FAMILY MEDICINE | Facility: CLINIC | Age: 84
End: 2019-01-31

## 2019-01-31 VITALS
HEIGHT: 65 IN | HEART RATE: 119 BPM | TEMPERATURE: 98 F | BODY MASS INDEX: 42.59 KG/M2 | OXYGEN SATURATION: 95 % | DIASTOLIC BLOOD PRESSURE: 80 MMHG | WEIGHT: 255.63 LBS | SYSTOLIC BLOOD PRESSURE: 140 MMHG

## 2019-01-31 DIAGNOSIS — M79.605 LEG PAIN, CENTRAL, LEFT: Primary | ICD-10-CM

## 2019-01-31 PROCEDURE — 99499 UNLISTED E&M SERVICE: CPT | Mod: S$GLB,,, | Performed by: FAMILY MEDICINE

## 2019-01-31 PROCEDURE — 99499 RISK ADDL DX/OHS AUDIT: ICD-10-PCS | Mod: S$GLB,,, | Performed by: FAMILY MEDICINE

## 2019-01-31 PROCEDURE — 99213 OFFICE O/P EST LOW 20 MIN: CPT | Mod: S$GLB,,, | Performed by: FAMILY MEDICINE

## 2019-01-31 PROCEDURE — 1101F PT FALLS ASSESS-DOCD LE1/YR: CPT | Mod: CPTII,S$GLB,, | Performed by: FAMILY MEDICINE

## 2019-01-31 PROCEDURE — 99213 PR OFFICE/OUTPT VISIT, EST, LEVL III, 20-29 MIN: ICD-10-PCS | Mod: S$GLB,,, | Performed by: FAMILY MEDICINE

## 2019-01-31 PROCEDURE — 1101F PR PT FALLS ASSESS DOC 0-1 FALLS W/OUT INJ PAST YR: ICD-10-PCS | Mod: CPTII,S$GLB,, | Performed by: FAMILY MEDICINE

## 2019-01-31 RX ORDER — DICLOFENAC SODIUM 10 MG/G
2 GEL TOPICAL 4 TIMES DAILY
Qty: 100 G | Refills: 2 | Status: SHIPPED | OUTPATIENT
Start: 2019-01-31 | End: 2019-03-06

## 2019-01-31 NOTE — TELEPHONE ENCOUNTER
----- Message from Page Brian sent at 1/31/2019 10:57 AM CST -----  Contact: [phone]/self  Patient is requesting to be seen today by any doctor. She has something wrong with the back of her right knee. Please call to schedule. Thanks    Do you want to see her at 220?

## 2019-02-01 ENCOUNTER — TELEPHONE (OUTPATIENT)
Dept: FAMILY MEDICINE | Facility: CLINIC | Age: 84
End: 2019-02-01

## 2019-02-01 DIAGNOSIS — E07.9 THYROID DISEASE: ICD-10-CM

## 2019-02-01 DIAGNOSIS — Z13.220 ENCOUNTER FOR LIPID SCREENING FOR CARDIOVASCULAR DISEASE: ICD-10-CM

## 2019-02-01 DIAGNOSIS — E66.01 MORBIDLY OBESE: ICD-10-CM

## 2019-02-01 DIAGNOSIS — Z13.6 ENCOUNTER FOR LIPID SCREENING FOR CARDIOVASCULAR DISEASE: ICD-10-CM

## 2019-02-01 DIAGNOSIS — R60.0 BILATERAL EDEMA OF LOWER EXTREMITY: Primary | ICD-10-CM

## 2019-02-01 NOTE — PROGRESS NOTES
Patient, Shilpa Christopher (MRN #[id number]), presented with a recorded BMI of 42.54 kg/m^2 consistent with the definition of morbid obesity (ICD-10 E66.01). The patient's morbid obesity was monitored, evaluated, addressed and/or treated. This addendum to the medical record is made on 02/01/2019.

## 2019-02-01 NOTE — TELEPHONE ENCOUNTER
----- Message from Soraya Berger sent at 2/1/2019  9:56 AM CST -----  Contact: self / [phone]  Patient is requesting a call back regarding, the gel for her knee. Please advise           I called the pt   She is ok as long as she is laying down   She has been applying ice  She feels that once she gets up it will hurt    She will get the gel today - pay cash $30 but I will submit PA form to N can take up to 72 business hours for approval      Please put in labs for [facility] - routine fasting labs - include lipid  She will go to [facility] next week

## 2019-02-01 NOTE — PROGRESS NOTES
" Patient ID: Shilpa Christopher is a 83 y.o. female.    Chief Complaint: Leg Pain (rt leg pain )    HPI       Shilpa Christopher is a 83 y.o. female complains of two day pain in her right knee medial aspect both superiorly and just inferiorly.  No erythema no warmth no edema. No fever chills nausea vomiting diarrhea.  Able to ambulate with walker.  Not much different than prior.  No lower back pain      Review of Symptoms    Constitutional  No change in activity, No chills fever   Resp  Neg hemoptysis, stridor, choking  CVS  Neg chest pain, palpitations    BP (!) 140/80 (BP Location: Left arm, Patient Position: Sitting)   Pulse (!) 119   Temp 98.3 °F (36.8 °C) (Oral)   Ht 5' 5" (1.651 m)   Wt 115.9 kg (255 lb 10 oz)   SpO2 95%   BMI 42.54 kg/m²     Physical Exam    Constitutional:   Oriented to person, place, and time.appears well-developed and well-nourished.   No distress.  he    HENT  Head: Normocephalic and atraumatic  Right Ear: External ear normal.   Left Ear: External ear normal.   Nose: External nose normal.   Mouth: Moist mucous membranes    Eyes:   Conjunctivae are normal. Right eye exhibits no discharge. Left eye exhibits no discharge. No scleral icterus. No periorbital edema    Musculoskeletal:  No edema. No obvious deformity No wasting    right knee-decreased range of motion due to pain and debility  Tenderness to palpation along the lateral aspect of her thigh over knee and just inferior to lateral collateral ligament.  No erythema no warmth.  Small Baker cyst posterior fossa    Neurological:  Alert and oriented to person, place, and time. Coordination normal.     Skin:   Skin is warm and dry.  No diaphoresis.   No rash noted.     Psychiatric: Normal mood and affect. Behavior is normal. Judgment and thought content normal.     Complete Blood Count  Lab Results   Component Value Date    RBC 4.48 05/08/2018    HGB 13.3 05/08/2018    HCT 42.7 05/08/2018    MCV 95 05/08/2018    MCH 29.7 05/08/2018    " MCHC 31.1 (L) 05/08/2018    RDW 13.7 05/08/2018     05/08/2018    MPV 9.2 05/08/2018    GRAN 5.7 05/08/2018    GRAN 78.2 (H) 05/08/2018    LYMPH 0.8 (L) 05/08/2018    LYMPH 10.9 (L) 05/08/2018    MONO 0.7 05/08/2018    MONO 9.5 05/08/2018    EOS 0.1 05/08/2018    BASO 0.02 05/08/2018    EOSINOPHIL 0.8 05/08/2018    BASOPHIL 0.3 05/08/2018    DIFFMETHOD Automated 05/08/2018       Comprehensive Metabolic Panel  No results found for: GLU, BUN, CREATININE, NA, K, CL, PROT, ALBUMIN, BILITOT, AST, ALKPHOS, CO2, ALT, ANIONGAP, EGFRNONAA, ESTGFRAFRICA    TSH  No results found for: TSH    Assessment / Plan:      ICD-10-CM ICD-9-CM   1. Leg pain, central, left M79.605 729.5     Leg pain, central, left    Other orders  -     diclofenac sodium (VOLTAREN) 1 % Gel; Apply 2 g topically 4 (four) times daily. for 10 days  Dispense: 100 g; Refill: 2     Patient cannot tolerate steroids-NSAIDs-suggest heat ice-wrapped with six sensation wraps-also suggest use of Voltaren gel topically.  Continue to walker and range of motion-

## 2019-02-14 RX ORDER — MONTELUKAST SODIUM 10 MG/1
TABLET ORAL
Qty: 90 TABLET | Refills: 3 | Status: SHIPPED | OUTPATIENT
Start: 2019-02-14 | End: 2020-02-09

## 2019-02-26 RX ORDER — ALPRAZOLAM 1 MG/1
TABLET ORAL
Qty: 90 TABLET | Refills: 0 | Status: SHIPPED | OUTPATIENT
Start: 2019-02-26 | End: 2019-03-27 | Stop reason: SDUPTHER

## 2019-02-26 RX ORDER — LEVOTHYROXINE SODIUM 125 UG/1
TABLET ORAL
Qty: 90 TABLET | Refills: 3 | Status: SHIPPED | OUTPATIENT
Start: 2019-02-26 | End: 2020-02-06

## 2019-03-03 ENCOUNTER — HOSPITAL ENCOUNTER (EMERGENCY)
Facility: HOSPITAL | Age: 84
Discharge: HOME | End: 2019-03-03
Attending: EMERGENCY MEDICINE
Payer: MEDICARE

## 2019-03-03 VITALS
BODY MASS INDEX: 41.97 KG/M2 | WEIGHT: 251.88 LBS | DIASTOLIC BLOOD PRESSURE: 66 MMHG | HEIGHT: 65 IN | TEMPERATURE: 98 F | HEART RATE: 98 BPM | OXYGEN SATURATION: 99 % | SYSTOLIC BLOOD PRESSURE: 155 MMHG | RESPIRATION RATE: 22 BRPM

## 2019-03-03 DIAGNOSIS — J45.41 MODERATE PERSISTENT ASTHMATIC BRONCHITIS WITH ACUTE EXACERBATION: Primary | ICD-10-CM

## 2019-03-03 DIAGNOSIS — R07.9 CHEST PAIN: ICD-10-CM

## 2019-03-03 LAB
ALBUMIN SERPL BCP-MCNC: 4.1 G/DL
ALP SERPL-CCNC: 91 U/L
ALT SERPL W/O P-5'-P-CCNC: 13 U/L
ANION GAP SERPL CALC-SCNC: 9 MMOL/L
AST SERPL-CCNC: 16 U/L
BASOPHILS # BLD AUTO: 0.04 K/UL
BASOPHILS NFR BLD: 0.5 %
BILIRUB SERPL-MCNC: 0.2 MG/DL
BUN SERPL-MCNC: 13 MG/DL
CALCIUM SERPL-MCNC: 8.3 MG/DL
CHLORIDE SERPL-SCNC: 101 MMOL/L
CO2 SERPL-SCNC: 28 MMOL/L
CREAT SERPL-MCNC: 0.89 MG/DL
DIFFERENTIAL METHOD: ABNORMAL
EOSINOPHIL # BLD AUTO: 0.2 K/UL
EOSINOPHIL NFR BLD: 2.6 %
ERYTHROCYTE [DISTWIDTH] IN BLOOD BY AUTOMATED COUNT: 13.5 %
EST. GFR  (AFRICAN AMERICAN): >60 ML/MIN/1.73 M^2
EST. GFR  (NON AFRICAN AMERICAN): >60 ML/MIN/1.73 M^2
GLUCOSE SERPL-MCNC: 118 MG/DL
HCT VFR BLD AUTO: 39.2 %
HGB BLD-MCNC: 12.4 G/DL
LACTATE SERPL-SCNC: 1.2 MMOL/L
LYMPHOCYTES # BLD AUTO: 1.8 K/UL
LYMPHOCYTES NFR BLD: 20.7 %
MCH RBC QN AUTO: 30.2 PG
MCHC RBC AUTO-ENTMCNC: 31.6 G/DL
MCV RBC AUTO: 95 FL
MONOCYTES # BLD AUTO: 0.6 K/UL
MONOCYTES NFR BLD: 7.4 %
NEUTROPHILS # BLD AUTO: 5.9 K/UL
NEUTROPHILS NFR BLD: 68.4 %
NT-PROBNP: 488 PG/ML
PLATELET # BLD AUTO: 341 K/UL
PMV BLD AUTO: 8.3 FL
POTASSIUM SERPL-SCNC: 3.6 MMOL/L
PROT SERPL-MCNC: 7.9 G/DL
RBC # BLD AUTO: 4.11 M/UL
SODIUM SERPL-SCNC: 138 MMOL/L
TROPONIN I SERPL DL<=0.01 NG/ML-MCNC: <0.012 NG/ML
WBC # BLD AUTO: 8.56 K/UL

## 2019-03-03 PROCEDURE — 83880 ASSAY OF NATRIURETIC PEPTIDE: CPT | Mod: ER

## 2019-03-03 PROCEDURE — 25000242 PHARM REV CODE 250 ALT 637 W/ HCPCS: Mod: ER

## 2019-03-03 PROCEDURE — 93010 EKG 12-LEAD: ICD-10-PCS | Mod: ,,, | Performed by: INTERNAL MEDICINE

## 2019-03-03 PROCEDURE — 93010 ELECTROCARDIOGRAM REPORT: CPT | Mod: ,,, | Performed by: INTERNAL MEDICINE

## 2019-03-03 PROCEDURE — 93005 ELECTROCARDIOGRAM TRACING: CPT | Mod: ER

## 2019-03-03 PROCEDURE — 27000221 HC OXYGEN, UP TO 24 HOURS: Mod: ER

## 2019-03-03 PROCEDURE — 25000242 PHARM REV CODE 250 ALT 637 W/ HCPCS: Mod: ER | Performed by: EMERGENCY MEDICINE

## 2019-03-03 PROCEDURE — 87040 BLOOD CULTURE FOR BACTERIA: CPT | Mod: 59,ER

## 2019-03-03 PROCEDURE — 99285 EMERGENCY DEPT VISIT HI MDM: CPT | Mod: 25,ER

## 2019-03-03 PROCEDURE — 94760 N-INVAS EAR/PLS OXIMETRY 1: CPT | Mod: ER

## 2019-03-03 PROCEDURE — 83605 ASSAY OF LACTIC ACID: CPT | Mod: ER

## 2019-03-03 PROCEDURE — 94640 AIRWAY INHALATION TREATMENT: CPT | Mod: ER

## 2019-03-03 PROCEDURE — 25000003 PHARM REV CODE 250: Mod: ER | Performed by: EMERGENCY MEDICINE

## 2019-03-03 PROCEDURE — 85025 COMPLETE CBC W/AUTO DIFF WBC: CPT | Mod: ER

## 2019-03-03 PROCEDURE — 80053 COMPREHEN METABOLIC PANEL: CPT | Mod: ER

## 2019-03-03 PROCEDURE — 84484 ASSAY OF TROPONIN QUANT: CPT | Mod: ER

## 2019-03-03 RX ORDER — ALBUTEROL SULFATE 2.5 MG/.5ML
2.5 SOLUTION RESPIRATORY (INHALATION)
Status: DISCONTINUED | OUTPATIENT
Start: 2019-03-03 | End: 2019-03-03

## 2019-03-03 RX ORDER — IPRATROPIUM BROMIDE AND ALBUTEROL SULFATE 2.5; .5 MG/3ML; MG/3ML
SOLUTION RESPIRATORY (INHALATION)
Status: COMPLETED
Start: 2019-03-03 | End: 2019-03-03

## 2019-03-03 RX ORDER — LEVALBUTEROL 1.25 MG/.5ML
1.25 SOLUTION, CONCENTRATE RESPIRATORY (INHALATION)
Status: DISCONTINUED | OUTPATIENT
Start: 2019-03-03 | End: 2019-03-03

## 2019-03-03 RX ORDER — LEVOFLOXACIN 500 MG/1
500 TABLET, FILM COATED ORAL DAILY
Qty: 5 TABLET | Refills: 0 | Status: SHIPPED | OUTPATIENT
Start: 2019-03-03 | End: 2019-03-10

## 2019-03-03 RX ORDER — IPRATROPIUM BROMIDE AND ALBUTEROL SULFATE 2.5; .5 MG/3ML; MG/3ML
3 SOLUTION RESPIRATORY (INHALATION) ONCE
Status: COMPLETED | OUTPATIENT
Start: 2019-03-03 | End: 2019-03-03

## 2019-03-03 RX ORDER — LEVOFLOXACIN 250 MG/1
500 TABLET ORAL
Status: COMPLETED | OUTPATIENT
Start: 2019-03-03 | End: 2019-03-03

## 2019-03-03 RX ORDER — GUAIFENESIN/DEXTROMETHORPHAN 100-10MG/5
5 SYRUP ORAL 4 TIMES DAILY PRN
Qty: 120 ML | Refills: 0 | COMMUNITY
Start: 2019-03-03 | End: 2019-03-13

## 2019-03-03 RX ORDER — IPRATROPIUM BROMIDE AND ALBUTEROL SULFATE 2.5; .5 MG/3ML; MG/3ML
3 SOLUTION RESPIRATORY (INHALATION)
Status: COMPLETED | OUTPATIENT
Start: 2019-03-03 | End: 2019-03-03

## 2019-03-03 RX ADMIN — IPRATROPIUM BROMIDE AND ALBUTEROL SULFATE 3 ML: .5; 3 SOLUTION RESPIRATORY (INHALATION) at 06:03

## 2019-03-03 RX ADMIN — IPRATROPIUM BROMIDE AND ALBUTEROL SULFATE 3 ML: 2.5; .5 SOLUTION RESPIRATORY (INHALATION) at 04:03

## 2019-03-03 RX ADMIN — IPRATROPIUM BROMIDE AND ALBUTEROL SULFATE 3 ML: .5; 3 SOLUTION RESPIRATORY (INHALATION) at 04:03

## 2019-03-03 RX ADMIN — LEVOFLOXACIN 500 MG: 250 TABLET, FILM COATED ORAL at 06:03

## 2019-03-03 NOTE — ED PROVIDER NOTES
"Encounter Date: 3/3/2019       History     Chief Complaint   Patient presents with    Shortness of Breath     Pt with c/o increased SOB since yesterday but became worse this morning. Pt also c/o productive cough of "brownish" sputum x2 weeks. Pt denies chest pain     83-year-old female presents with shortness of breath and wheezing for 1 day.  Patient also reports productive cough of brown sputum for 2 weeks.  Patient denies chest pain. Patient does have past medical history significant for asthma and COPD.  Patient has not smoked for over 30 years.          Review of patient's allergies indicates:   Allergen Reactions    Adhesive     Codeine     Hydrocodone     Keflex [cephalexin]     Morphine     Nsaids (non-steroidal anti-inflammatory drug)     Pneumovax 23 [pneumococcal 23-tanya ps vaccine]     Prednisone     Reglan [metoclopramide]     Tramadol      Past Medical History:   Diagnosis Date    Anxiety     Asthma     GERD (gastroesophageal reflux disease)     Left knee pain     Leg pain, central, left     Thyroid disease      Past Surgical History:   Procedure Laterality Date    APPENDECTOMY      CHOLECYSTECTOMY      HYSTERECTOMY      THYROIDECTOMY      TONSILLECTOMY       Family History   Problem Relation Age of Onset    Diabetes Mother     Heart attack Father      Social History     Tobacco Use    Smoking status: Never Smoker    Smokeless tobacco: Never Used   Substance Use Topics    Alcohol use: No    Drug use: No     Review of Systems   Respiratory: Positive for cough, shortness of breath and wheezing.    All other systems reviewed and are negative.      Physical Exam     Initial Vitals [03/03/19 0422]   BP Pulse Resp Temp SpO2   (!) 184/86 99 (!) 22 98.7 °F (37.1 °C) 96 %      MAP       --         Physical Exam    Nursing note and vitals reviewed.  Constitutional: She appears well-developed and well-nourished.   HENT:   Head: Normocephalic and atraumatic.   Right Ear: External ear " normal.   Left Ear: External ear normal.   Nose: Nose normal.   Mouth/Throat: Oropharynx is clear and moist.   Eyes: Conjunctivae and EOM are normal. Pupils are equal, round, and reactive to light.   Neck: Normal range of motion. Neck supple.   Cardiovascular: Normal rate, regular rhythm, normal heart sounds and intact distal pulses.   Pulmonary/Chest: She has wheezes.   Abdominal: Soft. Bowel sounds are normal.   Musculoskeletal: Normal range of motion.   Neurological: She is alert. GCS score is 15. GCS eye subscore is 4. GCS verbal subscore is 5. GCS motor subscore is 6.   Skin: Skin is warm. Capillary refill takes less than 2 seconds.         ED Course   Procedures  Labs Reviewed   CBC W/ AUTO DIFFERENTIAL - Abnormal; Notable for the following components:       Result Value    MCHC 31.6 (*)     MPV 8.3 (*)     All other components within normal limits   COMPREHENSIVE METABOLIC PANEL - Abnormal; Notable for the following components:    Glucose 118 (*)     Calcium 8.3 (*)     All other components within normal limits   CULTURE, BLOOD   CULTURE, BLOOD   TROPONIN I   LACTIC ACID, PLASMA   NT-PRO NATRIURETIC PEPTIDE     EKG Readings: (Independently Interpreted)   Initial Reading: No STEMI. Rhythm: Normal Sinus Rhythm. Heart Rate: 95. Ectopy: PACs. ST Segments: Non-Specific ST Segment Depression. T Waves: Normal. Other Impression: Sinus rhythm with PAC, low-voltage QRS, right bundle branch block, abnormal EKG        Results for orders placed or performed during the hospital encounter of 03/03/19   Blood culture   Result Value Ref Range    Blood Culture, Routine No Growth to date    Blood culture   Result Value Ref Range    Blood Culture, Routine No Growth to date    CBC auto differential   Result Value Ref Range    WBC 8.56 3.90 - 12.70 K/uL    RBC 4.11 4.00 - 5.40 M/uL    Hemoglobin 12.4 12.0 - 16.0 g/dL    Hematocrit 39.2 37.0 - 48.5 %    MCV 95 82 - 98 fL    MCH 30.2 27.0 - 31.0 pg    MCHC 31.6 (L) 32.0 - 36.0 g/dL     RDW 13.5 11.5 - 14.5 %    Platelets 341 150 - 350 K/uL    MPV 8.3 (L) 9.2 - 12.9 fL    Gran # (ANC) 5.9 1.8 - 7.7 K/uL    Lymph # 1.8 1.0 - 4.8 K/uL    Mono # 0.6 0.3 - 1.0 K/uL    Eos # 0.2 0.0 - 0.5 K/uL    Baso # 0.04 0.00 - 0.20 K/uL    Gran% 68.4 38.0 - 73.0 %    Lymph% 20.7 18.0 - 48.0 %    Mono% 7.4 4.0 - 15.0 %    Eosinophil% 2.6 0.0 - 8.0 %    Basophil% 0.5 0.0 - 1.9 %    Differential Method Automated    Comprehensive metabolic panel   Result Value Ref Range    Sodium 138 136 - 145 mmol/L    Potassium 3.6 3.5 - 5.1 mmol/L    Chloride 101 95 - 110 mmol/L    CO2 28 23 - 29 mmol/L    Glucose 118 (H) 70 - 110 mg/dL    BUN, Bld 13 7 - 17 mg/dL    Creatinine 0.89 0.50 - 1.40 mg/dL    Calcium 8.3 (L) 8.7 - 10.5 mg/dL    Total Protein 7.9 6.0 - 8.4 g/dL    Albumin 4.1 3.5 - 5.2 g/dL    Total Bilirubin 0.2 0.1 - 1.0 mg/dL    Alkaline Phosphatase 91 38 - 126 U/L    AST 16 15 - 46 U/L    ALT 13 10 - 44 U/L    Anion Gap 9 8 - 16 mmol/L    eGFR if African American >60.0 >60 mL/min/1.73 m^2    eGFR if non African American >60.0 >60 mL/min/1.73 m^2   Troponin I #1   Result Value Ref Range    Troponin I <0.012 0.012 - 0.034 ng/mL   Lactic acid, plasma   Result Value Ref Range    Lactate (Lactic Acid) 1.2 0.5 - 2.2 mmol/L   NT-Pro Natriuretic Peptide   Result Value Ref Range    NT-proBNP 488 5 - 1800 pg/mL       Imaging Results          X-Ray Chest 1 View (Final result)  Result time 03/03/19 08:18:05    Final result by Adiel Ramos MD (03/03/19 08:18:05)                 Impression:      No acute infiltrate.  Mild cardiomegaly.      Electronically signed by: Adiel Ramos MD  Date:    03/03/2019  Time:    08:18             Narrative:    EXAMINATION:  XR CHEST 1 VIEW    CLINICAL HISTORY:  Respiratory distress, SOB;    COMPARISON:  05/08/2018    FINDINGS:  Heart size is mildly enlarged.  The lung fields are clear. No acute cardiopulmonary infiltrate.                                                      Clinical  Impression:       ICD-10-CM ICD-9-CM   1. Moderate persistent asthmatic bronchitis with acute exacerbation J45.41 493.92   2. Chest pain R07.9 786.50                                Augusto Michele MD  03/04/19 0600

## 2019-03-06 ENCOUNTER — OFFICE VISIT (OUTPATIENT)
Dept: FAMILY MEDICINE | Facility: CLINIC | Age: 84
End: 2019-03-06
Payer: MEDICARE

## 2019-03-06 ENCOUNTER — TELEPHONE (OUTPATIENT)
Dept: FAMILY MEDICINE | Facility: CLINIC | Age: 84
End: 2019-03-06

## 2019-03-06 VITALS
TEMPERATURE: 98 F | WEIGHT: 251 LBS | HEART RATE: 87 BPM | DIASTOLIC BLOOD PRESSURE: 70 MMHG | HEIGHT: 65 IN | OXYGEN SATURATION: 96 % | SYSTOLIC BLOOD PRESSURE: 132 MMHG | BODY MASS INDEX: 41.82 KG/M2

## 2019-03-06 DIAGNOSIS — J20.9 COPD (CHRONIC OBSTRUCTIVE PULMONARY DISEASE) WITH ACUTE BRONCHITIS: ICD-10-CM

## 2019-03-06 DIAGNOSIS — R06.02 SOB (SHORTNESS OF BREATH): Primary | ICD-10-CM

## 2019-03-06 DIAGNOSIS — J44.0 COPD (CHRONIC OBSTRUCTIVE PULMONARY DISEASE) WITH ACUTE BRONCHITIS: ICD-10-CM

## 2019-03-06 PROCEDURE — 99213 OFFICE O/P EST LOW 20 MIN: CPT | Mod: S$GLB,,, | Performed by: FAMILY MEDICINE

## 2019-03-06 PROCEDURE — 99499 UNLISTED E&M SERVICE: CPT | Mod: S$GLB,,, | Performed by: FAMILY MEDICINE

## 2019-03-06 PROCEDURE — 1101F PT FALLS ASSESS-DOCD LE1/YR: CPT | Mod: CPTII,S$GLB,, | Performed by: FAMILY MEDICINE

## 2019-03-06 PROCEDURE — 99499 RISK ADDL DX/OHS AUDIT: ICD-10-PCS | Mod: S$GLB,,, | Performed by: FAMILY MEDICINE

## 2019-03-06 PROCEDURE — 99213 PR OFFICE/OUTPT VISIT, EST, LEVL III, 20-29 MIN: ICD-10-PCS | Mod: S$GLB,,, | Performed by: FAMILY MEDICINE

## 2019-03-06 PROCEDURE — 1101F PR PT FALLS ASSESS DOC 0-1 FALLS W/OUT INJ PAST YR: ICD-10-PCS | Mod: CPTII,S$GLB,, | Performed by: FAMILY MEDICINE

## 2019-03-06 NOTE — TELEPHONE ENCOUNTER
----- Message from Page Brian sent at 3/6/2019  2:46 PM CST -----  Contact: Mack [phone]/self  Patient is requesting that patient be seen today by anyone. Please call her.

## 2019-03-06 NOTE — TELEPHONE ENCOUNTER
----- Message from Susy Sapp sent at 3/6/2019 11:53 AM CST -----  Contact: daughter Mack [phone]  Patient's daughter is requesting to speak with Dr. Hernandez concerning patient's symptoms. She is feeling weak, possibly from bronchitis. Please call and advise.

## 2019-03-07 NOTE — PROGRESS NOTES
Patient ID: Shilpa Christopher is a 83 y.o. female.    Chief Complaint: Follow-up    HPI      Shipla Christopher is a 83 y.o. female presents with continued cough and congestion and shortness of breath however better than when presented to the emergency room.  Patient with longstanding COPD uses Advair inhaled nebulizer medication.  Was using medication but felt short of breath acutely early Sunday morning.  Presented to the emergency room given chest x-ray which showed no specific pneumonia.  Given Levaquin 500 mg daily for seven days treat for possible pneumonia.  Probable bronchitis it appears.  No new persistent vertigo emesis or diarrhea.    Very poor eyesight-worried about living home ask about assisted living            Review of Symptoms    Constitutional  some activity change, No chills /fever   Resp  Neg hemoptysis, stridor, choking-use of-positive cough  CVS  Neg chest pain, palpitations    Physical Exam    Constitutional:  Oriented to person, place, and time.appears well-developed and well-nourished.  No distress.      HENT  Head: Normocephalic and atraumatic  Right Ear: External ear normal.   Left Ear: External ear normal.   Nose: External nose normal.   Mouth:  Moist mucus membranes.    Eyes:  Conjunctivae are normal. Right eye exhibits no discharge.  Left eye exhibits no discharge. No scleral icterus.  No periorbital edema    Cardiovascular:  Regular rate and rhythm with normal S1 and S2     Pulmonary/Chest:   Clear to auscultation bilaterally without wheezes, rhonchi or rales      Musculoskeletal:  No edema. No obvious deformity No wasting       Neurological:  Alert and oriented to person, place, and time.   Coordination normal.     Skin:   Skin is warm and dry.  No diaphoresis.   No rash noted.     Psychiatric: Normal mood and affect. Behavior is normal.  Judgment and thought content normal.     Complete Blood Count  Lab Results   Component Value Date    RBC 4.11 03/03/2019    HGB 12.4 03/03/2019     HCT 39.2 03/03/2019    MCV 95 03/03/2019    MCH 30.2 03/03/2019    MCHC 31.6 (L) 03/03/2019    RDW 13.5 03/03/2019     03/03/2019    MPV 8.3 (L) 03/03/2019    GRAN 5.9 03/03/2019    GRAN 68.4 03/03/2019    LYMPH 1.8 03/03/2019    LYMPH 20.7 03/03/2019    MONO 0.6 03/03/2019    MONO 7.4 03/03/2019    EOS 0.2 03/03/2019    BASO 0.04 03/03/2019    EOSINOPHIL 2.6 03/03/2019    BASOPHIL 0.5 03/03/2019    DIFFMETHOD Automated 03/03/2019       Comprehensive Metabolic Panel  Lab Results   Component Value Date     (H) 03/03/2019    BUN 13 03/03/2019    CREATININE 0.89 03/03/2019     03/03/2019    K 3.6 03/03/2019     03/03/2019    PROT 7.9 03/03/2019    ALBUMIN 4.1 03/03/2019    BILITOT 0.2 03/03/2019    AST 16 03/03/2019    ALKPHOS 91 03/03/2019    CO2 28 03/03/2019    ALT 13 03/03/2019    ANIONGAP 9 03/03/2019    EGFRNONAA >60.0 03/03/2019    ESTGFRAFRICA >60.0 03/03/2019       TSH  No results found for: TSH    Assessment / Plan:      ICD-10-CM ICD-9-CM   1. SOB (shortness of breath) R06.02 786.05   2. COPD (chronic obstructive pulmonary disease) with acute bronchitis J44.0 491.22    J20.9      SOB (shortness of breath)    COPD (chronic obstructive pulmonary disease) with acute bronchitis      Continue medications instructed can use increased doses ipratropium bromide if albuterol makes her jittery.    Allergic to other medications such as steroids

## 2019-03-08 LAB
BACTERIA BLD CULT: NORMAL
BACTERIA BLD CULT: NORMAL

## 2019-03-19 RX ORDER — IPRATROPIUM BROMIDE 17 UG/1
AEROSOL, METERED RESPIRATORY (INHALATION)
Qty: 3 INHALER | Refills: 3 | Status: SHIPPED | OUTPATIENT
Start: 2019-03-19 | End: 2020-01-08

## 2019-03-27 RX ORDER — ALPRAZOLAM 1 MG/1
TABLET ORAL
Qty: 90 TABLET | Refills: 0 | Status: SHIPPED | OUTPATIENT
Start: 2019-03-27 | End: 2019-04-22 | Stop reason: SDUPTHER

## 2019-04-22 ENCOUNTER — TELEPHONE (OUTPATIENT)
Dept: FAMILY MEDICINE | Facility: CLINIC | Age: 84
End: 2019-04-22

## 2019-04-22 RX ORDER — DICYCLOMINE HYDROCHLORIDE 10 MG/1
20 CAPSULE ORAL 4 TIMES DAILY
Qty: 120 CAPSULE | Refills: 1 | Status: SHIPPED | OUTPATIENT
Start: 2019-04-22 | End: 2019-05-22 | Stop reason: SDUPTHER

## 2019-04-22 RX ORDER — FUROSEMIDE 40 MG/1
TABLET ORAL
Qty: 90 TABLET | Refills: 1 | Status: SHIPPED | OUTPATIENT
Start: 2019-04-22 | End: 2019-10-11 | Stop reason: SDUPTHER

## 2019-04-22 RX ORDER — ALPRAZOLAM 1 MG/1
1 TABLET ORAL 3 TIMES DAILY
Qty: 90 TABLET | Refills: 0 | Status: SHIPPED | OUTPATIENT
Start: 2019-04-22 | End: 2019-05-22 | Stop reason: SDUPTHER

## 2019-04-22 NOTE — TELEPHONE ENCOUNTER
----- Message from Page Brian sent at 4/22/2019  3:13 PM CDT -----  Contact: [phone] /self  YANIRA  Patient is requesting to be seen this week by [facility]. Just for a monthly check up.

## 2019-04-22 NOTE — TELEPHONE ENCOUNTER
Pt needs refill for dicyclomine - she has been out all weekend  Lasix 40 mg   Alprazolam 1 mg       I have scheduled her for 4/30/19 to see dr harris

## 2019-04-23 ENCOUNTER — TELEPHONE (OUTPATIENT)
Dept: FAMILY MEDICINE | Facility: CLINIC | Age: 84
End: 2019-04-23

## 2019-04-23 NOTE — TELEPHONE ENCOUNTER
----- Message from Mekhi Blake sent at 4/23/2019 12:56 PM CDT -----  Contact: [phone]/self  Patient requesting to speak with you concerning medication.  Please call back to assist at [phone]

## 2019-05-08 RX ORDER — ALBUTEROL SULFATE 90 UG/1
AEROSOL, METERED RESPIRATORY (INHALATION)
Qty: 1 EACH | Refills: 11 | Status: SHIPPED | OUTPATIENT
Start: 2019-05-08 | End: 2020-05-11

## 2019-05-21 RX ORDER — OMEPRAZOLE 20 MG/1
20 CAPSULE, DELAYED RELEASE ORAL DAILY
Qty: 90 CAPSULE | Refills: 1 | Status: SHIPPED | OUTPATIENT
Start: 2019-05-21 | End: 2019-11-11 | Stop reason: SDUPTHER

## 2019-05-22 NOTE — TELEPHONE ENCOUNTER
----- Message from Susy Sapp sent at 5/22/2019 11:32 AM CDT -----  Type:  RX Refill Request    Who Called: pt  Refill or New Rx: Refills  RX Name and Strength: dicyclomine (BENTYL) 10 MG capsule  How is the patient currently taking it? (ex. 1XDay): Take 2 capsules (20 mg total) by mouth 4 (four) times daily.   Is this a 30 day or 90 day RX: 30 day  RX Name and Strength: ALPRAZolam (XANAX) 1 MG tablet  How is the patient currently taking it? (ex. 1XDay): Take 1 tablet (1 mg total) by mouth 3 (three) times daily.   Is this a 30 day or 90 day RX: 30 day    Preferred Pharmacy with phone number: [facility]  Local or Mail Order: Local  Ordering Provider: Dr. Hernandez  Would the patient rather a call back or a response via [facility]? Call back  Best Call Back Number: [phone]  Additional Information:

## 2019-05-23 RX ORDER — ALPRAZOLAM 1 MG/1
TABLET ORAL
Qty: 90 TABLET | Refills: 0 | Status: SHIPPED | OUTPATIENT
Start: 2019-05-23 | End: 2019-07-23 | Stop reason: SDUPTHER

## 2019-05-23 RX ORDER — DICYCLOMINE HYDROCHLORIDE 10 MG/1
20 CAPSULE ORAL 4 TIMES DAILY
Qty: 120 CAPSULE | Refills: 1 | Status: SHIPPED | OUTPATIENT
Start: 2019-05-23 | End: 2019-06-22

## 2019-05-23 RX ORDER — DICYCLOMINE HYDROCHLORIDE 10 MG/1
CAPSULE ORAL
Qty: 120 CAPSULE | Refills: 1 | Status: SHIPPED | OUTPATIENT
Start: 2019-05-23 | End: 2019-07-09 | Stop reason: SDUPTHER

## 2019-05-23 RX ORDER — ALPRAZOLAM 1 MG/1
1 TABLET ORAL 3 TIMES DAILY
Qty: 90 TABLET | Refills: 0 | Status: SHIPPED | OUTPATIENT
Start: 2019-05-23 | End: 2019-07-23

## 2019-07-09 RX ORDER — FLUTICASONE PROPIONATE AND SALMETEROL 50; 250 UG/1; UG/1
POWDER RESPIRATORY (INHALATION)
Qty: 1 EACH | Refills: 12 | Status: SHIPPED | OUTPATIENT
Start: 2019-07-09 | End: 2020-07-28

## 2019-07-09 RX ORDER — DICYCLOMINE HYDROCHLORIDE 10 MG/1
CAPSULE ORAL
Qty: 120 CAPSULE | Refills: 1 | Status: SHIPPED | OUTPATIENT
Start: 2019-07-09 | End: 2019-08-05 | Stop reason: SDUPTHER

## 2019-07-09 NOTE — TELEPHONE ENCOUNTER
----- Message from Georgina Garcia sent at 7/9/2019 12:19 PM CDT -----  PATIENT DOES NOT HAVE DOSE FOR THIS AFTERNOON  Patient is requesting a medication refill.     RX name: dicyclomine (BENTYL) 10 MG capsule  Strength:   Quantity:   Directions:TAKE 2 CAPSULES BY MOUTH FOUR TIMES A DAY     Pharmacy name:  [facility]      Phone number where patient can be reached:

## 2019-07-23 RX ORDER — ALPRAZOLAM 1 MG/1
TABLET ORAL
Qty: 90 TABLET | Refills: 0 | Status: SHIPPED | OUTPATIENT
Start: 2019-07-23 | End: 2019-08-21 | Stop reason: SDUPTHER

## 2019-08-05 RX ORDER — DICYCLOMINE HYDROCHLORIDE 10 MG/1
CAPSULE ORAL
Qty: 120 CAPSULE | Refills: 1 | Status: SHIPPED | OUTPATIENT
Start: 2019-08-05 | End: 2019-08-22

## 2019-08-05 NOTE — TELEPHONE ENCOUNTER
----- Message from Linda Dian sent at 8/5/2019  2:17 PM CDT -----  Contact: Eileen, daughter, [phone]  Called in requesting patient's Dicyclomine 10 mg prescription refill sent to [facility] in [place]. States she takes  2 tablets 4 times per day.

## 2019-08-21 RX ORDER — ALPRAZOLAM 1 MG/1
TABLET ORAL
Qty: 90 TABLET | Refills: 0 | Status: SHIPPED | OUTPATIENT
Start: 2019-08-21 | End: 2019-08-22 | Stop reason: SDUPTHER

## 2019-08-22 NOTE — TELEPHONE ENCOUNTER
----- Message from Linda Biggs sent at 8/22/2019 11:42 AM CDT -----  Contact: Eileen, daughter, [phone]  Requests Alprazolam 1 mg prescription refill sent to [facility] in [place].States she takes it 3 times per day.  And requests to speak with you regarding Dicyclomine 10 mg. States patient takes 2 pills 4 times per day and wants to know if it can be changed to the 20 mg pill.

## 2019-08-23 RX ORDER — DICYCLOMINE HYDROCHLORIDE 10 MG/1
20 CAPSULE ORAL 2 TIMES DAILY
Qty: 180 CAPSULE | Refills: 1 | Status: SHIPPED | OUTPATIENT
Start: 2019-08-23 | End: 2019-09-03 | Stop reason: SDUPTHER

## 2019-08-23 RX ORDER — ALPRAZOLAM 1 MG/1
1 TABLET ORAL 3 TIMES DAILY
Qty: 90 TABLET | Refills: 0 | Status: SHIPPED | OUTPATIENT
Start: 2019-08-23 | End: 2019-09-18 | Stop reason: SDUPTHER

## 2019-09-03 RX ORDER — DICYCLOMINE HYDROCHLORIDE 10 MG/1
20 CAPSULE ORAL 2 TIMES DAILY
Qty: 180 CAPSULE | Refills: 1 | Status: SHIPPED | OUTPATIENT
Start: 2019-09-03 | End: 2019-09-04 | Stop reason: SDUPTHER

## 2019-09-03 NOTE — TELEPHONE ENCOUNTER
----- Message from Zenia Macias sent at 9/3/2019  9:05 AM CDT -----  Type:  RX Refill Request    Who Called: Eileen cordero's daughter   Refill or New Rx:refill   RX Name and Strength:dicyclomine (BENTYL) 10 MG capsule  How is the patient currently taking it? (ex. 1XDay):Take 2 capsules (20 mg total) by mouth 2 (two) times daily  Is this a 30 day or 90 day RX:30 day with refill   Preferred Pharmacy with phone number:[id number] - [place], [place] - [address] [place] [address]  Local or Mail Order:local   Ordering Provider:Dr. Hernandez   Would the patient rather a call back or a response via [facility]?callback   Best Call Back Number:[phone]  Additional Information: n/a

## 2019-09-04 ENCOUNTER — TELEPHONE (OUTPATIENT)
Dept: FAMILY MEDICINE | Facility: CLINIC | Age: 84
End: 2019-09-04

## 2019-09-04 RX ORDER — DICYCLOMINE HYDROCHLORIDE 10 MG/1
20 CAPSULE ORAL 4 TIMES DAILY
Qty: 720 CAPSULE | Refills: 1 | Status: SHIPPED | OUTPATIENT
Start: 2019-09-04 | End: 2020-03-12

## 2019-09-04 NOTE — TELEPHONE ENCOUNTER
----- Message from Soraya Berger sent at 9/4/2019  3:55 PM CDT -----  Contact: Daughter Deana - [phone]  Needs to speak with you about the below medication. Please advise   dicyclomine (BENTYL) 10 MG capsule      I spoke with the pt daughter   Usually takes 2 cap QID - but you only sent in 2 cap BID -   Rather then 10 mg 2 cap QID, can you prescribe 20 mg 1 cap QID  She needs 90 day supply sent

## 2019-09-06 ENCOUNTER — TELEPHONE (OUTPATIENT)
Dept: FAMILY MEDICINE | Facility: CLINIC | Age: 84
End: 2019-09-06

## 2019-09-06 NOTE — TELEPHONE ENCOUNTER
----- Message from Adeola Durham sent at 9/6/2019  3:29 PM CDT -----  Contact: [phone]-self  Pt is requesting a call back from Marylin about direction of prescription being incorrect.

## 2019-09-18 RX ORDER — ALPRAZOLAM 1 MG/1
1 TABLET ORAL 3 TIMES DAILY
Qty: 90 TABLET | Refills: 0 | Status: SHIPPED | OUTPATIENT
Start: 2019-09-18 | End: 2019-10-11 | Stop reason: SDUPTHER

## 2019-09-18 NOTE — TELEPHONE ENCOUNTER
----- Message from All Castro sent at 9/18/2019  3:58 PM CDT -----  Contact: daughter  Patient would like refill of ALPRAZolam (XANAX) 1 MG tablet sent to [id number] - [place], [address]. Please advise.

## 2019-10-11 RX ORDER — ALPRAZOLAM 1 MG/1
1 TABLET ORAL 3 TIMES DAILY
Qty: 90 TABLET | Refills: 0 | Status: SHIPPED | OUTPATIENT
Start: 2019-10-11 | End: 2019-11-11 | Stop reason: SDUPTHER

## 2019-10-11 RX ORDER — FUROSEMIDE 40 MG/1
TABLET ORAL
Qty: 90 TABLET | Refills: 1 | Status: SHIPPED | OUTPATIENT
Start: 2019-10-11 | End: 2020-04-06

## 2019-10-11 NOTE — TELEPHONE ENCOUNTER
----- Message from All Castro sent at 10/11/2019 12:09 PM CDT -----  Contact: daughter  Patient would like refill of furosemide (LASIX) 40 MG tablet and ALPRAZolam (XANAX) 1 MG tablet sent to [id number] - [facility], [address]. Please advise.

## 2019-11-11 RX ORDER — OMEPRAZOLE 20 MG/1
CAPSULE, DELAYED RELEASE ORAL
Qty: 90 CAPSULE | Refills: 0 | Status: SHIPPED | OUTPATIENT
Start: 2019-11-11 | End: 2020-02-02

## 2019-11-11 RX ORDER — ALPRAZOLAM 1 MG/1
TABLET ORAL
Qty: 90 TABLET | Refills: 0 | Status: SHIPPED | OUTPATIENT
Start: 2019-11-11 | End: 2019-12-13 | Stop reason: SDUPTHER

## 2019-12-15 RX ORDER — ALPRAZOLAM 1 MG/1
TABLET ORAL
Qty: 90 TABLET | Refills: 0 | Status: SHIPPED | OUTPATIENT
Start: 2019-12-15 | End: 2020-01-14

## 2020-01-07 ENCOUNTER — OFFICE VISIT (OUTPATIENT)
Dept: FAMILY MEDICINE | Facility: CLINIC | Age: 85
End: 2020-01-07
Payer: MEDICARE

## 2020-01-07 VITALS
OXYGEN SATURATION: 96 % | HEIGHT: 65 IN | DIASTOLIC BLOOD PRESSURE: 76 MMHG | HEART RATE: 93 BPM | BODY MASS INDEX: 41.14 KG/M2 | TEMPERATURE: 98 F | WEIGHT: 246.94 LBS | SYSTOLIC BLOOD PRESSURE: 132 MMHG

## 2020-01-07 DIAGNOSIS — N39.0 URINARY TRACT INFECTION WITHOUT HEMATURIA, SITE UNSPECIFIED: Primary | ICD-10-CM

## 2020-01-07 PROCEDURE — 1159F PR MEDICATION LIST DOCUMENTED IN MEDICAL RECORD: ICD-10-PCS | Mod: S$GLB,,, | Performed by: FAMILY MEDICINE

## 2020-01-07 PROCEDURE — 87077 CULTURE AEROBIC IDENTIFY: CPT

## 2020-01-07 PROCEDURE — 99213 OFFICE O/P EST LOW 20 MIN: CPT | Mod: S$GLB,,, | Performed by: FAMILY MEDICINE

## 2020-01-07 PROCEDURE — 1100F PR PT FALLS ASSESS DOC 2+ FALLS/FALL W/INJURY/YR: ICD-10-PCS | Mod: CPTII,S$GLB,, | Performed by: FAMILY MEDICINE

## 2020-01-07 PROCEDURE — 99499 RISK ADDL DX/OHS AUDIT: ICD-10-PCS | Mod: S$GLB,,, | Performed by: FAMILY MEDICINE

## 2020-01-07 PROCEDURE — 1100F PTFALLS ASSESS-DOCD GE2>/YR: CPT | Mod: CPTII,S$GLB,, | Performed by: FAMILY MEDICINE

## 2020-01-07 PROCEDURE — 3288F FALL RISK ASSESSMENT DOCD: CPT | Mod: CPTII,S$GLB,, | Performed by: FAMILY MEDICINE

## 2020-01-07 PROCEDURE — 87086 URINE CULTURE/COLONY COUNT: CPT

## 2020-01-07 PROCEDURE — 1159F MED LIST DOCD IN RCRD: CPT | Mod: S$GLB,,, | Performed by: FAMILY MEDICINE

## 2020-01-07 PROCEDURE — 3288F PR FALLS RISK ASSESSMENT DOCUMENTED: ICD-10-PCS | Mod: CPTII,S$GLB,, | Performed by: FAMILY MEDICINE

## 2020-01-07 PROCEDURE — 99213 PR OFFICE/OUTPT VISIT, EST, LEVL III, 20-29 MIN: ICD-10-PCS | Mod: S$GLB,,, | Performed by: FAMILY MEDICINE

## 2020-01-07 PROCEDURE — 1126F AMNT PAIN NOTED NONE PRSNT: CPT | Mod: S$GLB,,, | Performed by: FAMILY MEDICINE

## 2020-01-07 PROCEDURE — 99499 UNLISTED E&M SERVICE: CPT | Mod: S$GLB,,, | Performed by: FAMILY MEDICINE

## 2020-01-07 PROCEDURE — 1126F PR PAIN SEVERITY QUANTIFIED, NO PAIN PRESENT: ICD-10-PCS | Mod: S$GLB,,, | Performed by: FAMILY MEDICINE

## 2020-01-07 PROCEDURE — 87088 URINE BACTERIA CULTURE: CPT

## 2020-01-07 PROCEDURE — 87186 SC STD MICRODIL/AGAR DIL: CPT

## 2020-01-07 RX ORDER — PHENAZOPYRIDINE HYDROCHLORIDE 200 MG/1
200 TABLET, FILM COATED ORAL 3 TIMES DAILY PRN
Qty: 30 TABLET | Refills: 0 | Status: SHIPPED | OUTPATIENT
Start: 2020-01-07 | End: 2020-01-17

## 2020-01-07 RX ORDER — SULFAMETHOXAZOLE AND TRIMETHOPRIM 800; 160 MG/1; MG/1
1 TABLET ORAL 2 TIMES DAILY
Qty: 20 TABLET | Refills: 0 | Status: SHIPPED | OUTPATIENT
Start: 2020-01-07 | End: 2020-01-17

## 2020-01-08 RX ORDER — IPRATROPIUM BROMIDE 17 UG/1
AEROSOL, METERED RESPIRATORY (INHALATION)
Qty: 12.9 INHALER | Refills: 2 | Status: SHIPPED | OUTPATIENT
Start: 2020-01-08 | End: 2020-03-02

## 2020-01-09 ENCOUNTER — TELEPHONE (OUTPATIENT)
Dept: FAMILY MEDICINE | Facility: CLINIC | Age: 85
End: 2020-01-09

## 2020-01-09 LAB — BACTERIA UR CULT: ABNORMAL

## 2020-01-10 NOTE — TELEPHONE ENCOUNTER
Urinalysis culture showed bacteria in your urine consistent with urinary tract infection.  The bacteria should be killed or is sensitive to the current Bactrim medication.  No additional treatment other than prescribed is needed

## 2020-01-14 RX ORDER — ALPRAZOLAM 1 MG/1
TABLET ORAL
Qty: 90 TABLET | Refills: 1 | Status: SHIPPED | OUTPATIENT
Start: 2020-01-14 | End: 2020-03-12

## 2020-01-19 NOTE — PROGRESS NOTES
" Patient ID: Shilpa Christopher is a 84 y.o. female.    Chief Complaint: Urinary Tract Infection    HPI      Shilpa Christopher is a 84 y.o. female here with complaints dysuria without gross hematuria.  No fever chills.  Urinary frequency and urgency.  COPD-stable at this time no new problems continue to use albuterol in occasional use rescue inhaler.  No problems recently.    Vitals:    01/07/20 1152   BP: 132/76   BP Location: Left arm   Patient Position: Sitting   Pulse: 93   Temp: 97.6 °F (36.4 °C)   TempSrc: Oral   SpO2: 96%   Weight: 112 kg (246 lb 14.6 oz)   Height: 5' 5" (1.651 m)            Review of Symptoms    Constitutional  Neg activity change, No chills /fever   Resp  Neg hemoptysis, stridor, choking  CVS  Neg chest pain, palpitations    Physical Exam    Constitutional:  Oriented to person, place, and time.appears well-developed and well-nourished.  No distress.      HENT  Head: Normocephalic and atraumatic  Right Ear: External ear normal.   Left Ear: External ear normal.   Nose: External nose normal.   Mouth:  Moist mucus membranes.    Eyes:  Conjunctivae are normal. Right eye exhibits no discharge.  Left eye exhibits no discharge. No scleral icterus.  No periorbital edema    Cardiovascular:  Regular rate and rhythm with normal S1 and S2     Pulmonary/Chest:   Clear to auscultation bilaterally without wheezes, rhonchi or rales      Musculoskeletal:  No edema. No obvious deformity No wasting       Neurological:  Alert and oriented to person, place, and time.   Coordination normal.     Skin:   Skin is warm and dry.  No diaphoresis.   No rash noted.     Psychiatric: Normal mood and affect. Behavior is normal.  Judgment and thought content normal.     Complete Blood Count  Lab Results   Component Value Date    RBC 4.11 03/03/2019    HGB 12.4 03/03/2019    HCT 39.2 03/03/2019    MCV 95 03/03/2019    MCH 30.2 03/03/2019    MCHC 31.6 (L) 03/03/2019    RDW 13.5 03/03/2019     03/03/2019    MPV 8.3 (L) " 03/03/2019    GRAN 5.9 03/03/2019    GRAN 68.4 03/03/2019    LYMPH 1.8 03/03/2019    LYMPH 20.7 03/03/2019    MONO 0.6 03/03/2019    MONO 7.4 03/03/2019    EOS 0.2 03/03/2019    BASO 0.04 03/03/2019    EOSINOPHIL 2.6 03/03/2019    BASOPHIL 0.5 03/03/2019    DIFFMETHOD Automated 03/03/2019       Comprehensive Metabolic Panel  No results found for: GLU, BUN, CREATININE, NA, K, CL, PROT, ALBUMIN, BILITOT, AST, ALKPHOS, CO2, ALT, ANIONGAP, EGFRNONAA, ESTGFRAFRICA    TSH  No results found for: TSH    Assessment / Plan:      ICD-10-CM ICD-9-CM   1. Urinary tract infection without hematuria, site unspecified N39.0 599.0     Urinary tract infection without hematuria, site unspecified  -     Urine culture; Future; Expected date: 01/07/2021    Other orders  -     sulfamethoxazole-trimethoprim 800-160mg (BACTRIM DS) 800-160 mg Tab; Take 1 tablet by mouth 2 (two) times daily. for 10 days  Dispense: 20 tablet; Refill: 0  -     phenazopyridine (PYRIDIUM) 200 MG tablet; Take 1 tablet (200 mg total) by mouth 3 (three) times daily as needed for Pain. With urination  Dispense: 30 tablet; Refill: 0     COPD-stable no problems

## 2020-01-22 ENCOUNTER — TELEPHONE (OUTPATIENT)
Dept: FAMILY MEDICINE | Facility: CLINIC | Age: 85
End: 2020-01-22

## 2020-01-22 RX ORDER — TIZANIDINE 2 MG/1
2 TABLET ORAL EVERY 6 HOURS PRN
Qty: 30 TABLET | Refills: 0 | Status: SHIPPED | OUTPATIENT
Start: 2020-01-22 | End: 2020-02-01

## 2020-01-22 NOTE — TELEPHONE ENCOUNTER
----- Message from Esteban Butler sent at 1/22/2020  3:50 PM CST -----  Contact: Pt daughter Eileen  Pt daughter called and would like Marylin to call her back    Pt twisted wrong in the shower    Pt can be reached at [phone]

## 2020-01-22 NOTE — TELEPHONE ENCOUNTER
I spoke with the patient about this. [facility]  No further action needed at this time.

## 2020-02-02 RX ORDER — OMEPRAZOLE 20 MG/1
CAPSULE, DELAYED RELEASE ORAL
Qty: 90 CAPSULE | Refills: 0 | Status: SHIPPED | OUTPATIENT
Start: 2020-02-02 | End: 2020-05-08

## 2020-02-06 RX ORDER — LEVOTHYROXINE SODIUM 125 UG/1
TABLET ORAL
Qty: 90 TABLET | Refills: 3 | Status: SHIPPED | OUTPATIENT
Start: 2020-02-06

## 2020-02-09 RX ORDER — MONTELUKAST SODIUM 10 MG/1
TABLET ORAL
Qty: 90 TABLET | Refills: 3 | Status: SHIPPED | OUTPATIENT
Start: 2020-02-09

## 2020-02-16 ENCOUNTER — HOSPITAL ENCOUNTER (OUTPATIENT)
Facility: HOSPITAL | Age: 85
LOS: 1 days | Discharge: HOME | End: 2020-02-17
Attending: EMERGENCY MEDICINE | Admitting: INTERNAL MEDICINE
Payer: MEDICARE

## 2020-02-16 DIAGNOSIS — R60.0 BILATERAL EDEMA OF LOWER EXTREMITY: ICD-10-CM

## 2020-02-16 DIAGNOSIS — R06.02 SHORTNESS OF BREATH: ICD-10-CM

## 2020-02-16 DIAGNOSIS — J44.1 COPD EXACERBATION: Primary | ICD-10-CM

## 2020-02-16 DIAGNOSIS — R53.81 PHYSICAL DECONDITIONING: ICD-10-CM

## 2020-02-16 DIAGNOSIS — J20.9 COPD (CHRONIC OBSTRUCTIVE PULMONARY DISEASE) WITH ACUTE BRONCHITIS: ICD-10-CM

## 2020-02-16 DIAGNOSIS — J44.0 COPD (CHRONIC OBSTRUCTIVE PULMONARY DISEASE) WITH ACUTE BRONCHITIS: ICD-10-CM

## 2020-02-16 DIAGNOSIS — F41.9 ANXIETY: ICD-10-CM

## 2020-02-16 DIAGNOSIS — E07.9 THYROID DISEASE: ICD-10-CM

## 2020-02-16 DIAGNOSIS — K21.9 GASTROESOPHAGEAL REFLUX DISEASE, ESOPHAGITIS PRESENCE NOT SPECIFIED: ICD-10-CM

## 2020-02-16 DIAGNOSIS — E66.01 MORBIDLY OBESE: ICD-10-CM

## 2020-02-16 LAB
ALBUMIN SERPL BCP-MCNC: 4 G/DL (ref 3.5–5.2)
ALP SERPL-CCNC: 77 U/L (ref 38–126)
ALT SERPL W/O P-5'-P-CCNC: 14 U/L (ref 10–44)
ANION GAP SERPL CALC-SCNC: 10 MMOL/L (ref 8–16)
AST SERPL-CCNC: 19 U/L (ref 15–46)
BASOPHILS # BLD AUTO: 0.07 K/UL (ref 0–0.2)
BASOPHILS NFR BLD: 0.7 % (ref 0–1.9)
BILIRUB SERPL-MCNC: 0.3 MG/DL (ref 0.1–1)
BILIRUB UR QL STRIP: NEGATIVE
BUN SERPL-MCNC: 10 MG/DL (ref 7–17)
CALCIUM SERPL-MCNC: 8.4 MG/DL (ref 8.7–10.5)
CHLORIDE SERPL-SCNC: 103 MMOL/L (ref 95–110)
CLARITY UR REFRACT.AUTO: CLEAR
CO2 SERPL-SCNC: 26 MMOL/L (ref 23–29)
COLOR UR AUTO: NORMAL
CREAT SERPL-MCNC: 0.91 MG/DL (ref 0.5–1.4)
DIFFERENTIAL METHOD: ABNORMAL
EOSINOPHIL # BLD AUTO: 0.2 K/UL (ref 0–0.5)
EOSINOPHIL NFR BLD: 1.7 % (ref 0–8)
ERYTHROCYTE [DISTWIDTH] IN BLOOD BY AUTOMATED COUNT: 13.3 % (ref 11.5–14.5)
EST. GFR  (AFRICAN AMERICAN): >60 ML/MIN/1.73 M^2
EST. GFR  (NON AFRICAN AMERICAN): 58.1 ML/MIN/1.73 M^2
GLUCOSE SERPL-MCNC: 97 MG/DL (ref 70–110)
GLUCOSE UR QL STRIP: NEGATIVE
HCT VFR BLD AUTO: 38.2 % (ref 37–48.5)
HGB BLD-MCNC: 12.3 G/DL (ref 12–16)
HGB UR QL STRIP: NEGATIVE
IMM GRANULOCYTES # BLD AUTO: 0.03 K/UL (ref 0–0.04)
IMM GRANULOCYTES NFR BLD AUTO: 0.3 % (ref 0–0.5)
INFLUENZA A, MOLECULAR: NEGATIVE
INFLUENZA B, MOLECULAR: NEGATIVE
KETONES UR QL STRIP: NEGATIVE
LEUKOCYTE ESTERASE UR QL STRIP: NEGATIVE
LYMPHOCYTES # BLD AUTO: 1.4 K/UL (ref 1–4.8)
LYMPHOCYTES NFR BLD: 13.1 % (ref 18–48)
MCH RBC QN AUTO: 30.6 PG (ref 27–31)
MCHC RBC AUTO-ENTMCNC: 32.2 G/DL (ref 32–36)
MCV RBC AUTO: 95 FL (ref 82–98)
MONOCYTES # BLD AUTO: 0.8 K/UL (ref 0.3–1)
MONOCYTES NFR BLD: 7.7 % (ref 4–15)
NEUTROPHILS # BLD AUTO: 8.2 K/UL (ref 1.8–7.7)
NEUTROPHILS NFR BLD: 76.5 % (ref 38–73)
NITRITE UR QL STRIP: NEGATIVE
NRBC BLD-RTO: 0 /100 WBC
NT-PROBNP: 430 PG/ML (ref 5–1800)
PH UR STRIP: 7 [PH] (ref 5–8)
PLATELET # BLD AUTO: 288 K/UL (ref 150–350)
PMV BLD AUTO: 9.2 FL (ref 9.2–12.9)
POTASSIUM SERPL-SCNC: 3.8 MMOL/L (ref 3.5–5.1)
PROT SERPL-MCNC: 7.5 G/DL (ref 6–8.4)
PROT UR QL STRIP: NEGATIVE
RBC # BLD AUTO: 4.02 M/UL (ref 4–5.4)
SODIUM SERPL-SCNC: 139 MMOL/L (ref 136–145)
SP GR UR STRIP: 1.01 (ref 1–1.03)
SPECIMEN SOURCE: NORMAL
TROPONIN I SERPL DL<=0.01 NG/ML-MCNC: <0.012 NG/ML (ref 0.01–0.03)
URN SPEC COLLECT METH UR: NORMAL
UROBILINOGEN UR STRIP-ACNC: NEGATIVE EU/DL
WBC # BLD AUTO: 10.65 K/UL (ref 3.9–12.7)

## 2020-02-16 PROCEDURE — 99285 EMERGENCY DEPT VISIT HI MDM: CPT | Mod: 25,ER

## 2020-02-16 PROCEDURE — 94640 AIRWAY INHALATION TREATMENT: CPT

## 2020-02-16 PROCEDURE — 87502 INFLUENZA DNA AMP PROBE: CPT | Mod: ER

## 2020-02-16 PROCEDURE — 93010 EKG 12-LEAD: ICD-10-PCS | Mod: ,,, | Performed by: INTERNAL MEDICINE

## 2020-02-16 PROCEDURE — 63600175 PHARM REV CODE 636 W HCPCS: Mod: ER | Performed by: PHYSICIAN ASSISTANT

## 2020-02-16 PROCEDURE — 94640 AIRWAY INHALATION TREATMENT: CPT | Mod: ER

## 2020-02-16 PROCEDURE — 99900035 HC TECH TIME PER 15 MIN (STAT)

## 2020-02-16 PROCEDURE — 84484 ASSAY OF TROPONIN QUANT: CPT | Mod: ER

## 2020-02-16 PROCEDURE — 80053 COMPREHEN METABOLIC PANEL: CPT | Mod: ER

## 2020-02-16 PROCEDURE — 25000003 PHARM REV CODE 250: Performed by: STUDENT IN AN ORGANIZED HEALTH CARE EDUCATION/TRAINING PROGRAM

## 2020-02-16 PROCEDURE — 94761 N-INVAS EAR/PLS OXIMETRY MLT: CPT

## 2020-02-16 PROCEDURE — 85025 COMPLETE CBC W/AUTO DIFF WBC: CPT | Mod: ER

## 2020-02-16 PROCEDURE — 96374 THER/PROPH/DIAG INJ IV PUSH: CPT | Mod: ER

## 2020-02-16 PROCEDURE — 25000242 PHARM REV CODE 250 ALT 637 W/ HCPCS: Performed by: STUDENT IN AN ORGANIZED HEALTH CARE EDUCATION/TRAINING PROGRAM

## 2020-02-16 PROCEDURE — 94760 N-INVAS EAR/PLS OXIMETRY 1: CPT | Mod: ER

## 2020-02-16 PROCEDURE — 83880 ASSAY OF NATRIURETIC PEPTIDE: CPT | Mod: ER

## 2020-02-16 PROCEDURE — 93005 ELECTROCARDIOGRAM TRACING: CPT | Mod: ER

## 2020-02-16 PROCEDURE — G0378 HOSPITAL OBSERVATION PER HR: HCPCS

## 2020-02-16 PROCEDURE — 25000242 PHARM REV CODE 250 ALT 637 W/ HCPCS: Mod: ER | Performed by: PHYSICIAN ASSISTANT

## 2020-02-16 PROCEDURE — 93010 ELECTROCARDIOGRAM REPORT: CPT | Mod: ,,, | Performed by: INTERNAL MEDICINE

## 2020-02-16 PROCEDURE — 27000221 HC OXYGEN, UP TO 24 HOURS: Mod: ER

## 2020-02-16 PROCEDURE — 81003 URINALYSIS AUTO W/O SCOPE: CPT | Mod: ER

## 2020-02-16 RX ORDER — FUROSEMIDE 40 MG/1
40 TABLET ORAL DAILY
Status: DISCONTINUED | OUTPATIENT
Start: 2020-02-17 | End: 2020-02-17 | Stop reason: HOSPADM

## 2020-02-16 RX ORDER — IPRATROPIUM BROMIDE AND ALBUTEROL SULFATE 2.5; .5 MG/3ML; MG/3ML
3 SOLUTION RESPIRATORY (INHALATION) ONCE
Status: DISCONTINUED | OUTPATIENT
Start: 2020-02-16 | End: 2020-02-16

## 2020-02-16 RX ORDER — LEVOFLOXACIN 5 MG/ML
750 INJECTION, SOLUTION INTRAVENOUS
Status: DISCONTINUED | OUTPATIENT
Start: 2020-02-17 | End: 2020-02-17

## 2020-02-16 RX ORDER — IPRATROPIUM BROMIDE AND ALBUTEROL SULFATE 2.5; .5 MG/3ML; MG/3ML
3 SOLUTION RESPIRATORY (INHALATION) ONCE
Status: COMPLETED | OUTPATIENT
Start: 2020-02-16 | End: 2020-02-16

## 2020-02-16 RX ORDER — METHYLPREDNISOLONE SOD SUCC 125 MG
125 VIAL (EA) INJECTION
Status: COMPLETED | OUTPATIENT
Start: 2020-02-16 | End: 2020-02-16

## 2020-02-16 RX ORDER — ALBUTEROL SULFATE 2.5 MG/.5ML
2.5 SOLUTION RESPIRATORY (INHALATION)
Status: COMPLETED | OUTPATIENT
Start: 2020-02-16 | End: 2020-02-16

## 2020-02-16 RX ORDER — PANTOPRAZOLE SODIUM 40 MG/1
40 TABLET, DELAYED RELEASE ORAL DAILY
Status: DISCONTINUED | OUTPATIENT
Start: 2020-02-17 | End: 2020-02-17 | Stop reason: HOSPADM

## 2020-02-16 RX ORDER — IPRATROPIUM BROMIDE AND ALBUTEROL SULFATE 2.5; .5 MG/3ML; MG/3ML
3 SOLUTION RESPIRATORY (INHALATION)
Status: DISCONTINUED | OUTPATIENT
Start: 2020-02-16 | End: 2020-02-17 | Stop reason: HOSPADM

## 2020-02-16 RX ORDER — ACETAMINOPHEN 325 MG/1
325 TABLET ORAL EVERY 6 HOURS PRN
Status: DISCONTINUED | OUTPATIENT
Start: 2020-02-16 | End: 2020-02-17

## 2020-02-16 RX ORDER — DICYCLOMINE HYDROCHLORIDE 10 MG/1
20 CAPSULE ORAL 4 TIMES DAILY
Status: DISCONTINUED | OUTPATIENT
Start: 2020-02-16 | End: 2020-02-17 | Stop reason: HOSPADM

## 2020-02-16 RX ORDER — FLUTICASONE FUROATE AND VILANTEROL 100; 25 UG/1; UG/1
1 POWDER RESPIRATORY (INHALATION) DAILY
Status: DISCONTINUED | OUTPATIENT
Start: 2020-02-16 | End: 2020-02-17 | Stop reason: HOSPADM

## 2020-02-16 RX ORDER — ALPRAZOLAM 1 MG/1
1 TABLET ORAL 3 TIMES DAILY
Status: DISCONTINUED | OUTPATIENT
Start: 2020-02-16 | End: 2020-02-16

## 2020-02-16 RX ORDER — MONTELUKAST SODIUM 10 MG/1
10 TABLET ORAL DAILY
Status: DISCONTINUED | OUTPATIENT
Start: 2020-02-16 | End: 2020-02-16

## 2020-02-16 RX ORDER — PREDNISONE 20 MG/1
40 TABLET ORAL DAILY
Status: DISCONTINUED | OUTPATIENT
Start: 2020-02-17 | End: 2020-02-17 | Stop reason: HOSPADM

## 2020-02-16 RX ORDER — ALPRAZOLAM 1 MG/1
1 TABLET ORAL 3 TIMES DAILY
Status: DISCONTINUED | OUTPATIENT
Start: 2020-02-16 | End: 2020-02-17 | Stop reason: HOSPADM

## 2020-02-16 RX ORDER — ENOXAPARIN SODIUM 100 MG/ML
40 INJECTION SUBCUTANEOUS EVERY 24 HOURS
Status: DISCONTINUED | OUTPATIENT
Start: 2020-02-17 | End: 2020-02-17 | Stop reason: HOSPADM

## 2020-02-16 RX ORDER — MONTELUKAST SODIUM 10 MG/1
10 TABLET ORAL DAILY
Status: DISCONTINUED | OUTPATIENT
Start: 2020-02-17 | End: 2020-02-17 | Stop reason: HOSPADM

## 2020-02-16 RX ORDER — LEVOFLOXACIN 750 MG/1
750 TABLET ORAL
Status: COMPLETED | OUTPATIENT
Start: 2020-02-16 | End: 2020-02-16

## 2020-02-16 RX ADMIN — IPRATROPIUM BROMIDE AND ALBUTEROL SULFATE 3 ML: .5; 3 SOLUTION RESPIRATORY (INHALATION) at 08:02

## 2020-02-16 RX ADMIN — ACETAMINOPHEN 325 MG: 325 TABLET ORAL at 10:02

## 2020-02-16 RX ADMIN — IPRATROPIUM BROMIDE AND ALBUTEROL SULFATE 3 ML: .5; 3 SOLUTION RESPIRATORY (INHALATION) at 04:02

## 2020-02-16 RX ADMIN — LEVOFLOXACIN 750 MG: 750 TABLET, FILM COATED ORAL at 11:02

## 2020-02-16 RX ADMIN — IPRATROPIUM BROMIDE AND ALBUTEROL SULFATE 3 ML: .5; 3 SOLUTION RESPIRATORY (INHALATION) at 11:02

## 2020-02-16 RX ADMIN — ALPRAZOLAM 1 MG: 1 TABLET ORAL at 10:02

## 2020-02-16 RX ADMIN — METHYLPREDNISOLONE SODIUM SUCCINATE 125 MG: 125 INJECTION, POWDER, FOR SOLUTION INTRAMUSCULAR; INTRAVENOUS at 10:02

## 2020-02-16 RX ADMIN — DICYCLOMINE HYDROCHLORIDE 20 MG: 10 CAPSULE ORAL at 10:02

## 2020-02-16 RX ADMIN — DICYCLOMINE HYDROCHLORIDE 20 MG: 10 CAPSULE ORAL at 05:02

## 2020-02-16 RX ADMIN — ALPRAZOLAM 1 MG: 1 TABLET ORAL at 02:02

## 2020-02-16 RX ADMIN — ALBUTEROL SULFATE 2.5 MG: 2.5 SOLUTION RESPIRATORY (INHALATION) at 09:02

## 2020-02-16 RX ADMIN — IPRATROPIUM BROMIDE AND ALBUTEROL SULFATE 3 ML: .5; 3 SOLUTION RESPIRATORY (INHALATION) at 10:02

## 2020-02-16 RX ADMIN — FLUTICASONE FUROATE AND VILANTEROL TRIFENATATE 1 PUFF: 100; 25 POWDER RESPIRATORY (INHALATION) at 03:02

## 2020-02-16 NOTE — ED NOTES
Attempted to phone report, Nurse unavailable, left number and name for Nurse to return the phone call

## 2020-02-16 NOTE — PLAN OF CARE
Pt received on nasal cannula at   1 lpm.  SPO2   98%.  Pt in no apparent respiratory distress.  Will continue to monitor.

## 2020-02-16 NOTE — ED PROVIDER NOTES
Encounter Date: 2/16/2020       History     Chief Complaint   Patient presents with    Shortness of Breath     I am short of breath and coughing for two days.     Cough     84-year-old female presents to the emergency department for evaluation of 3 day history of shortness of breath, productive cough and chest tightness.  She reports that the symptoms began gradually 3 days ago and have been constant since onset.  She reports that the cough is a constant, productive cough with thick yellow sputum.  She reports that she has associated wheezing, chest tightness and shortness of breath. She reports that she attempted treatment with an albuterol treatment this morning, which did not help with her wheezing or shortness of breath. She denies any known fever, headache, dizziness, palpitations, chest pain, abdominal pain, nausea, vomiting, dysuria or leg swelling. She reports that she has been taking your regular emphysema medications, and has not tried any over-the-counter medications.  She states that her last albuterol treatment was just prior to arrival.        Review of patient's allergies indicates:   Allergen Reactions    Adhesive     Codeine     Flexeril [cyclobenzaprine]     Hydrocodone     Keflex [cephalexin]     Morphine     Nsaids (non-steroidal anti-inflammatory drug)     Pneumovax 23 [pneumococcal 23-tanya ps vaccine]     Prednisone     Reglan [metoclopramide]     Tramadol      Past Medical History:   Diagnosis Date    Anxiety     Asthma     GERD (gastroesophageal reflux disease)     Left knee pain     Leg pain, central, left     Thyroid disease      Past Surgical History:   Procedure Laterality Date    APPENDECTOMY      CHOLECYSTECTOMY      HYSTERECTOMY      THYROIDECTOMY      TONSILLECTOMY       Family History   Problem Relation Age of Onset    Diabetes Mother     Heart attack Father      Social History     Tobacco Use    Smoking status: Never Smoker    Smokeless tobacco: Never Used    Substance Use Topics    Alcohol use: No    Drug use: No     Review of Systems   Constitutional: Negative for activity change, appetite change and fever.   HENT: Negative for congestion, rhinorrhea, sore throat, trouble swallowing and voice change.    Eyes: Negative for photophobia and visual disturbance.   Respiratory: Positive for cough, chest tightness, shortness of breath and wheezing.    Cardiovascular: Negative for chest pain, palpitations and leg swelling.   Gastrointestinal: Negative for abdominal pain, constipation, diarrhea, nausea and vomiting.   Genitourinary: Negative for decreased urine volume and dysuria.   Musculoskeletal: Negative for back pain, joint swelling and neck pain.   Skin: Negative for rash.   Neurological: Negative for dizziness, syncope, weakness, light-headedness, numbness and headaches.       Physical Exam     Initial Vitals [02/16/20 0919]   BP Pulse Resp Temp SpO2   (!) 140/80 101 (!) 28 98.3 °F (36.8 °C) 96 %      MAP       --         Physical Exam    Nursing note and vitals reviewed.  Constitutional: She appears well-developed and well-nourished. She is not diaphoretic. No distress.   HENT:   Head: Normocephalic and atraumatic.   Right Ear: External ear normal.   Left Ear: External ear normal.   Nose: Nose normal.   Mouth/Throat: Oropharynx is clear and moist.   Eyes: Conjunctivae and EOM are normal. Pupils are equal, round, and reactive to light.   Neck: Normal range of motion. Neck supple.   Cardiovascular: Normal rate, regular rhythm and normal heart sounds.   Pulmonary/Chest: No respiratory distress. She has wheezes. She has no rhonchi. She has no rales. She exhibits no tenderness.   Abdominal: Soft. Bowel sounds are normal. She exhibits no distension. There is no tenderness. There is no rebound.   Lymphadenopathy:     She has no cervical adenopathy.   Neurological: She is alert and oriented to person, place, and time.   Skin: Skin is warm and dry.   Psychiatric: She has a  normal mood and affect.         ED Course   Procedures  Labs Reviewed   INFLUENZA A & B BY MOLECULAR   CBC W/ AUTO DIFFERENTIAL   COMPREHENSIVE METABOLIC PANEL   TROPONIN I   B-TYPE NATRIURETIC PEPTIDE     EKG Readings: (Independently Interpreted)   Initial Reading: No STEMI. Rhythm: Sinus rhythm with short KS. Heart Rate: 90.       Imaging Results    None          Medical Decision Making:   Initial Assessment:   84-year-old female presents emergency department for evaluation of shortness of breath, productive cough and chest tightness.  Physical exam reveals a nontoxic-appearing female in no acute distress. Patient is afebrile vital signs within normal limits.  Neurological exam reveals an alert and oriented patient.  Neck is supple, no meningeal signs noted.  Auscultation of the lungs reveals expiratory wheezes noted throughout all lung fields.  No respiratory distress or accessory muscle use noted. Abdominal exam reveals soft abdomen, nontender to palpation.  Differential Diagnosis:   Chest x-ray ordered to assess possible pneumonia or consolidation  Emphysema/COPD exacerbation  CHF  ACS  Influenza  ED Management:  EKG reveals no acute ST changes.  Patient given albuterol, DuoNebs and Solu-Medrol.  CBC reveals no acute leukocytosis or anemia.  CMP results within normal limits. Troponin less than 0.012.  .  Influenza negative.  Chest x-ray report reveals chronic interstitial coarsening similar to previous chest x-ray suggestive of emphysema. Urinalysis pending.  After multiple doses of albuterol/2 nebs and Solu-Medrol, patient still reports shortness of breath and chest tightness.  Reauscultation of the lungs reveals continued wheezes in all lung fields.  I discussed this patient with Dr. Ash who will admit this patient under her care.  Discussed these findings at length with the patient verbalizes understanding and agreement course of treatment.  Dr. Tate evaluated this patient and is in agreement  course of treatment.                                 Clinical Impression:       ICD-10-CM ICD-9-CM   1. COPD exacerbation J44.1 491.21   2. Shortness of breath R06.02 786.05                             Alia Molina PA-C  02/16/20 1147

## 2020-02-16 NOTE — H&P
[facility] History and Physical - Resident Note    Admitting Team: Medicine Team A   Attending Physician: Nilsa   Resident: Bernadette   Interns: Leidy    Date of Admit: 2/16/2020    Chief Complaint     Shortness of breath and productive cough for two days     Subjective:      History of Present Illness:    Shilpa Christopher is an 85y/o female with a past medical history of COPD, GERD. Anxiety, and hypothyroid presenting with a two day history of shortness of breath and productive cough.     The patient notes non-bloody, productive cough of clear sputum associated with shortness of breath which began three days ago and had been progressive prompting the patient to visit [facility] ED. The patient denies chest pain, recent travel, recent surgery, and lower extremity tenderness. The patient notes bilateral lower extremity edema at baseline and denies worsening symptoms today. The patient sleeps with one pillow at night and denies orthopnea.  The patient received breathing treatments, steroids, and levofloxacin  at [facility] and reports feeling better now. She is on 2L NC and does not use home oxygen. The patient quit smoking 28 years ago. The patient lives alone and performs all ADLs on her own.      Past Medical History:  Past Medical History:   Diagnosis Date    Anxiety     Asthma     GERD (gastroesophageal reflux disease)     Left knee pain     Leg pain, central, left     Thyroid disease        Past Surgical History:  Past Surgical History:   Procedure Laterality Date    APPENDECTOMY      CHOLECYSTECTOMY      HYSTERECTOMY      THYROIDECTOMY      TONSILLECTOMY         Allergies:  Review of patient's allergies indicates:   Allergen Reactions    Adhesive     Codeine     Flexeril [cyclobenzaprine]     Hydrocodone     Keflex [cephalexin]     Morphine     Nsaids (non-steroidal anti-inflammatory drug)     Pneumovax 23 [pneumococcal 23-tanya ps vaccine]     Prednisone     Reglan [metoclopramide]      Tramadol        Home Medications:  Prior to Admission medications    Medication Sig Start Date End Date Taking? Authorizing Provider   ADVAIR DISKUS 250-50 mcg/dose diskus inhaler INHALE 1 PUFF INTO THE LUNGS TWO TIMES A DAY 7/9/19   Reji Kang MD   albuterol (PROVENTIL) 2.5 mg /3 mL (0.083 %) nebulizer solution USE 1 VIAL IN NEBULIZER EVERY 6 HOURS AS NEEDED FOR WHEEZING 11/26/18   Zac Lakhani MD   ALPRAZolam (XANAX) 1 MG tablet TAKE 1 TABLET BY MOUTH THREE TIMES A DAY 1/14/20   Zac Lakhani MD   ATROVENT HFA 17 mcg/actuation inhaler INHALE 4 PUFFS FOUR TIMES A DAY 1/8/20   Zac Lakhani MD   dicyclomine (BENTYL) 10 MG capsule Take 2 capsules (20 mg total) by mouth 4 (four) times daily. 9/4/19   Zac Lakhani MD   FLUZONE HIGH-DOSE 2019-20, PF, 180 mcg/0.5 mL Syrg  11/3/19   Historical Provider, MD   furosemide (LASIX) 40 MG tablet TAKE 1 TABLET BY MOUTH UP TO TWO TIMES A DAY. OKAY TO TAKE 2 IN THE MORNING WITH FLUID GAIN 10/11/19   Zac Lakhani MD   levothyroxine (SYNTHROID) 125 MCG tablet TAKE 1 TABLET BY MOUTH EVERY DAY 2/6/20   Zac Lakhani MD   meclizine (ANTIVERT) 25 mg tablet TAKE 1 TABLET BY MOUTH THREE TIMES A DAY AS NEEDED 8/7/18   Zac Lakhani MD   montelukast (SINGULAIR) 10 mg tablet TAKE 1 TABLET BY MOUTH EVERY DAY 2/9/20   Reji Kang MD   omeprazole (PRILOSEC) 20 MG capsule TAKE 1 CAPSULE BY MOUTH EVERY DAY 2/2/20   Zac Lakhani MD   VENTOLIN HFA 90 mcg/actuation inhaler INHALE 2 PUFFS INTO THE LUNGS FOUR TIMES A DAY AS NEEDED 5/8/19   Reji Kang MD       Family History:  Family History   Problem Relation Age of Onset    Diabetes Mother     Heart attack Father        Social History:  Social History     Tobacco Use    Smoking status: Never Smoker    Smokeless tobacco: Never Used   Substance Use Topics    Alcohol use: No    Drug use: No       Review of Systems:  Pertinent positives and negatives are listed in HPI.  All  other systems are reviewed and are negative.    Health Maintaince :   Primary Care Physician: [facility]   Immunizations:   TDap is not up to date, .  Influenza is up to date, .  Pneumovax is up to date, .  Cancer Screening:  PAP: is up to date.   Mammogram: is up to date.   Colonoscopy: is up to date.      Objective:   Last 24 Hour Vital Signs:  BP  Min: 140/80  Max: 172/83  Temp  Av.9 °F (36.6 °C)  Min: 96.8 °F (36 °C)  Max: 98.5 °F (36.9 °C)  Pulse  Av.6  Min: 69  Max: 113  Resp  Av.2  Min: 19  Max: 28  SpO2  Av.3 %  Min: 94 %  Max: 98 %  Weight  Av.9 kg (211 lb 7.6 oz)  Min: 81.6 kg (180 lb)  Max: 110.2 kg (242 lb 15.2 oz)  Body mass index is 40.43 kg/m².  No intake/output data recorded.    Physical Examination:  General: Alert and awake in NAD  Head:  Normocephalic and atraumatic  Eyes:  PERRL; EOMi with anicteric sclera and clear conjunctivae  Mouth:  Oropharynx clear and without exudate; moist mucous membranes  Cardio:  Regular rate and rhythm with normal S1 and S2; no murmurs or rubs  Resp:  2L NC. Speaking in full sentences. No resp distress noted. Minimal wheezes appreciated. CTAB and unlabored; no crackles or rhonchi  Abdom: Soft, NTND with normoactive bowel sounds  Extrem: 1+BLLE WWP with no clubbing, cyanosis   Skin:  No rashes, lesions, or color changes  Pulses: 2+ and symmetric distally  Neuro:  AAOx3; cooperative and pleasant with no focal deficits    Laboratory:  Most Recent Data:  CBC:   Lab Results   Component Value Date    WBC 10.65 2020    HGB 12.3 2020    HCT 38.2 2020     2020    MCV 95 2020    RDW 13.3 2020     BMP:   Lab Results   Component Value Date     2020    K 3.8 2020     2020    CO2 26 2020    BUN 10 2020    CREATININE 0.91 2020    GLU 97 2020    CALCIUM 8.4 (L) 2020     LFTs:   Lab Results   Component Value Date    PROT 7.5 2020    ALBUMIN 4.0  02/16/2020    BILITOT 0.3 02/16/2020    AST 19 02/16/2020    ALKPHOS 77 02/16/2020    ALT 14 02/16/2020     Coags: No results found for: INR, PROTIME, PTT  Urinalysis:   Lab Results   Component Value Date    LABURIN ESCHERICHIA COLI  >100,000 cfu/ml   (A) 01/07/2020    COLORU Straw 02/16/2020    SPECGRAV 1.010 02/16/2020    NITRITE Negative 02/16/2020    PROTEINUR (-) 05/08/2018    KETONESU Negative 02/16/2020    UROBILINOGEN Negative 02/16/2020    BILIRUBINUR (-) 05/08/2018       Trended Lab Data:  Recent Labs   Lab 02/16/20  0949   WBC 10.65   HGB 12.3   HCT 38.2      MCV 95   RDW 13.3      K 3.8      CO2 26   BUN 10   CREATININE 0.91   GLU 97   PROT 7.5   ALBUMIN 4.0   BILITOT 0.3   AST 19   ALKPHOS 77   ALT 14       Trended Cardiac Data:  Recent Labs   Lab 02/16/20  0949   TROPONINI <0.012       Microbiology Data:  Influenza A and B molecular     Other Laboratory Data:      Other Results      Radiology:  Imaging Results          X-Ray Chest PA And Lateral (Final result)  Result time 02/16/20 10:40:43    Final result by Erik Campbell MD (02/16/20 10:40:43)                 Impression:      Chronic interstitial coarsening similar to 03/03/2019, with unchanged lucency of the lung apices suggesting moderate severity upper lobe predominant centrilobular emphysema.      Electronically signed by: Erik Campbell  Date:    02/16/2020  Time:    10:40             Narrative:    EXAMINATION:  XR CHEST PA AND LATERAL    CLINICAL HISTORY:  shortness of breath;    TECHNIQUE:  PA and lateral views of the chest were performed.    COMPARISON:  03/03/2019    FINDINGS:  Chronic interstitial coarsening similar to 03/03/2019, with unchanged lucency of the lung apices suggesting moderate severity upper lobe predominant centrilobular emphysema.    The cardiac silhouette is normal in size. The hilar and mediastinal contours are unremarkable.    Bones are intact.                                   Assessment:     Shilpa  Ashwin is a 84 y.o. female with COPD exacerbation.      Plan:     COPD exacerbation   -productive cough with shortness of breath x 2 days  -patient received breathing treatments at [facility]. To receive duonebs q4 while awake   -levofloxacin 750 PO x 1 in ED. To resume levofloxacin 750 q24 to begin 2/17/20  -received methylprednisolone succinate injection 125mg x 1 in ED. To resume prednisone 40mg to begin 2/17/20  -on 2L NC  -will plan to wean oxygen requirements and monitor for symptomatic improvement    Bilateral lower extremity edema  -chronic: 1+BLLE on exam   -continue home Lasix 40 q24    Generalized anxiety  -patient on long-term Xanax 1mg TID  -to continue home dose     Hypothyroidism  -patient toke home dose today   -levothyroxine 125mcg q24 beginning 2/17/20    GERD  -no acute issues  -pantoprazole EC 40mg q24     Morbid obesity   -patient counseled on diet and exercise    Code: Full  Diet: regular  DVT/GI PPX: lovenox, protonix  Dispo: pending oxygen wean and symptomatic improvement with likely discharge tomorrow with HH referral     Benedict Forbes  [facility] Internal Medicine HO-I  [facility] Medicine Service    [facility] Medicine Hospitalist Pager numbers:   [facility] Hospitalist Medicine Team A (Kerry/Nilsa): [phone]  [facility] Hospitalist Medicine Team B (Steph/Ysabel):  [phone]

## 2020-02-16 NOTE — PLAN OF CARE
VN cued into room.  Pt resting comfortably in bed with call light and personal belongings within reach, NC on.  NADN.  Daughter at bedside.   Pt alert and awake, able to answer admission questions.  Allowed time for questions.  Educated pt on fall risks.  Reminded pt to use call light as needed.  VN will continue to follow and be available as needed.

## 2020-02-17 VITALS
RESPIRATION RATE: 20 BRPM | HEART RATE: 102 BPM | WEIGHT: 243.81 LBS | HEIGHT: 66 IN | TEMPERATURE: 99 F | SYSTOLIC BLOOD PRESSURE: 133 MMHG | DIASTOLIC BLOOD PRESSURE: 72 MMHG | OXYGEN SATURATION: 93 % | BODY MASS INDEX: 39.18 KG/M2

## 2020-02-17 PROBLEM — R42 DIZZINESS: Status: ACTIVE | Noted: 2020-02-17

## 2020-02-17 LAB
ANION GAP SERPL CALC-SCNC: 9 MMOL/L (ref 8–16)
BUN SERPL-MCNC: 13 MG/DL (ref 8–23)
CALCIUM SERPL-MCNC: 8.7 MG/DL (ref 8.7–10.5)
CHLORIDE SERPL-SCNC: 104 MMOL/L (ref 95–110)
CO2 SERPL-SCNC: 25 MMOL/L (ref 23–29)
CREAT SERPL-MCNC: 1 MG/DL (ref 0.5–1.4)
EST. GFR  (AFRICAN AMERICAN): 60 ML/MIN/1.73 M^2
EST. GFR  (NON AFRICAN AMERICAN): 52 ML/MIN/1.73 M^2
GLUCOSE SERPL-MCNC: 143 MG/DL (ref 70–110)
POCT GLUCOSE: 145 MG/DL (ref 70–110)
POTASSIUM SERPL-SCNC: 4.2 MMOL/L (ref 3.5–5.1)
SODIUM SERPL-SCNC: 138 MMOL/L (ref 136–145)

## 2020-02-17 PROCEDURE — 94640 AIRWAY INHALATION TREATMENT: CPT

## 2020-02-17 PROCEDURE — 25000003 PHARM REV CODE 250: Performed by: STUDENT IN AN ORGANIZED HEALTH CARE EDUCATION/TRAINING PROGRAM

## 2020-02-17 PROCEDURE — 80048 BASIC METABOLIC PNL TOTAL CA: CPT

## 2020-02-17 PROCEDURE — 27000221 HC OXYGEN, UP TO 24 HOURS

## 2020-02-17 PROCEDURE — 94761 N-INVAS EAR/PLS OXIMETRY MLT: CPT

## 2020-02-17 PROCEDURE — G0378 HOSPITAL OBSERVATION PER HR: HCPCS

## 2020-02-17 PROCEDURE — 25000242 PHARM REV CODE 250 ALT 637 W/ HCPCS: Performed by: STUDENT IN AN ORGANIZED HEALTH CARE EDUCATION/TRAINING PROGRAM

## 2020-02-17 PROCEDURE — 63600175 PHARM REV CODE 636 W HCPCS: Performed by: STUDENT IN AN ORGANIZED HEALTH CARE EDUCATION/TRAINING PROGRAM

## 2020-02-17 PROCEDURE — 36415 COLL VENOUS BLD VENIPUNCTURE: CPT

## 2020-02-17 PROCEDURE — 96375 TX/PRO/DX INJ NEW DRUG ADDON: CPT

## 2020-02-17 RX ORDER — MECLIZINE HYDROCHLORIDE 25 MG/1
25 TABLET ORAL 3 TIMES DAILY PRN
Status: DISCONTINUED | OUTPATIENT
Start: 2020-02-17 | End: 2020-02-17 | Stop reason: HOSPADM

## 2020-02-17 RX ORDER — ACETAMINOPHEN 325 MG/1
650 TABLET ORAL EVERY 6 HOURS PRN
Status: DISCONTINUED | OUTPATIENT
Start: 2020-02-17 | End: 2020-02-17 | Stop reason: HOSPADM

## 2020-02-17 RX ORDER — MECLIZINE HYDROCHLORIDE 25 MG/1
25 TABLET ORAL 3 TIMES DAILY PRN
Status: DISCONTINUED | OUTPATIENT
Start: 2020-02-17 | End: 2020-02-17

## 2020-02-17 RX ORDER — GUAIFENESIN 600 MG/1
600 TABLET, EXTENDED RELEASE ORAL 2 TIMES DAILY
Qty: 20 TABLET | Refills: 0 | Status: SHIPPED | OUTPATIENT
Start: 2020-02-18 | End: 2020-02-23

## 2020-02-17 RX ORDER — LEVOFLOXACIN 5 MG/ML
750 INJECTION, SOLUTION INTRAVENOUS
Status: DISCONTINUED | OUTPATIENT
Start: 2020-02-17 | End: 2020-02-17 | Stop reason: HOSPADM

## 2020-02-17 RX ORDER — MECLIZINE HYDROCHLORIDE 25 MG/1
25 TABLET ORAL ONCE
Status: COMPLETED | OUTPATIENT
Start: 2020-02-17 | End: 2020-02-17

## 2020-02-17 RX ORDER — PREDNISONE 20 MG/1
40 TABLET ORAL DAILY
Qty: 6 TABLET | Refills: 0 | Status: SHIPPED | OUTPATIENT
Start: 2020-02-18 | End: 2020-02-21

## 2020-02-17 RX ORDER — ACETAMINOPHEN 500 MG
1000 TABLET ORAL ONCE
Status: COMPLETED | OUTPATIENT
Start: 2020-02-17 | End: 2020-02-17

## 2020-02-17 RX ORDER — GUAIFENESIN 600 MG/1
600 TABLET, EXTENDED RELEASE ORAL 2 TIMES DAILY
Status: DISCONTINUED | OUTPATIENT
Start: 2020-02-17 | End: 2020-02-17 | Stop reason: HOSPADM

## 2020-02-17 RX ORDER — LEVOFLOXACIN 750 MG/1
750 TABLET ORAL DAILY
Qty: 3 TABLET | Refills: 0 | Status: SHIPPED | OUTPATIENT
Start: 2020-02-18 | End: 2020-02-21

## 2020-02-17 RX ADMIN — FUROSEMIDE 40 MG: 40 TABLET ORAL at 10:02

## 2020-02-17 RX ADMIN — LEVOTHYROXINE SODIUM 125 MCG: 75 TABLET ORAL at 10:02

## 2020-02-17 RX ADMIN — MONTELUKAST 10 MG: 10 TABLET, FILM COATED ORAL at 10:02

## 2020-02-17 RX ADMIN — ENOXAPARIN SODIUM 40 MG: 100 INJECTION SUBCUTANEOUS at 05:02

## 2020-02-17 RX ADMIN — GUAIFENESIN 600 MG: 600 TABLET, EXTENDED RELEASE ORAL at 12:02

## 2020-02-17 RX ADMIN — LEVOFLOXACIN 750 MG: 750 INJECTION, SOLUTION INTRAVENOUS at 10:02

## 2020-02-17 RX ADMIN — FLUTICASONE FUROATE AND VILANTEROL TRIFENATATE 1 PUFF: 100; 25 POWDER RESPIRATORY (INHALATION) at 08:02

## 2020-02-17 RX ADMIN — PANTOPRAZOLE SODIUM 40 MG: 40 TABLET, DELAYED RELEASE ORAL at 10:02

## 2020-02-17 RX ADMIN — DICYCLOMINE HYDROCHLORIDE 20 MG: 10 CAPSULE ORAL at 10:02

## 2020-02-17 RX ADMIN — ALPRAZOLAM 1 MG: 1 TABLET ORAL at 10:02

## 2020-02-17 RX ADMIN — PREDNISONE 40 MG: 20 TABLET ORAL at 10:02

## 2020-02-17 RX ADMIN — IPRATROPIUM BROMIDE AND ALBUTEROL SULFATE 3 ML: .5; 3 SOLUTION RESPIRATORY (INHALATION) at 08:02

## 2020-02-17 RX ADMIN — DICYCLOMINE HYDROCHLORIDE 20 MG: 10 CAPSULE ORAL at 12:02

## 2020-02-17 RX ADMIN — ALPRAZOLAM 1 MG: 1 TABLET ORAL at 04:02

## 2020-02-17 RX ADMIN — DICYCLOMINE HYDROCHLORIDE 20 MG: 10 CAPSULE ORAL at 05:02

## 2020-02-17 RX ADMIN — MECLIZINE HYDROCHLORIDE 25 MG: 25 TABLET ORAL at 06:02

## 2020-02-17 RX ADMIN — MECLIZINE HYDROCHLORIDE 25 MG: 25 TABLET ORAL at 10:02

## 2020-02-17 RX ADMIN — IPRATROPIUM BROMIDE AND ALBUTEROL SULFATE 3 ML: .5; 3 SOLUTION RESPIRATORY (INHALATION) at 11:02

## 2020-02-17 RX ADMIN — ACETAMINOPHEN 1000 MG: 500 TABLET ORAL at 10:02

## 2020-02-17 NOTE — PROGRESS NOTES
"[facility] [place] [facility] Resident Progress Note    Subjective:      Shilpa Christopher is a 84 y.o. female who is being followed by the [facility] service at [facility] for COPD with acute exacerbation.    This morning, Ms. Christopher says she feels worse than when she got to the hospital.  Takes 1g of tylenol every night for her chronic LBP and bilateral knee pain, and only received 325 last night.  Also had episode of dizziness this morning that feels like "sea sickness" that she takes PRN meclizine for.  She got a dose but it only partially helped. Denies focal weakness or N/V. Would like breakfast this am.      Objective:   Last 24 Hour Vital Signs:  BP  Min: 129/60  Max: 172/83  Temp  Av.6 °F (36.4 °C)  Min: 96.2 °F (35.7 °C)  Max: 98.5 °F (36.9 °C)  Pulse  Av.9  Min: 69  Max: 118  Resp  Av  Min: 17  Max: 28  SpO2  Av.2 %  Min: 94 %  Max: 98 %  Height  Av' 6" (167.6 cm)  Min: 5' 6" (167.6 cm)  Max: 5' 6" (167.6 cm)  Weight  Av.8 kg (222 lb 4.1 oz)  Min: 81.6 kg (180 lb)  Max: 110.6 kg (243 lb 13.3 oz)  I/O last 3 completed shifts:  In: 255 [P.O.:255]  Out: 480 [Urine:480]    Physical Examination:  General: Alert and awake in no apparent distress  Head:  Normocephalic and atraumatic  Eyes:  PERRL; EOMi with anicteric sclera and clear conjunctivae  Mouth:  Oropharynx clear and without exudate; moist mucous membranes  Cardio:  Regular rate and rhythm with normal S1 and S2; no murmurs or rubs  Resp:  On A NC, speaking in full sentences, CTAB; respirations unlabored; expiratory wheezes to bilateral lung bases, no crackles or rhonchi  Abdom:            Soft, NTND with normoactive bowel sounds  Extrem: Warm and well-perfused with no clubbing, cyanosis or edema  Skin:  No rashes, lesions, or color changes  Pulses: 2+ and symmetric distally  Neuro:  AAOx3; cooperative and pleasant with no focal deficits     Laboratory:  Laboratory Data Reviewed: yes  Pertinent " Findings:  Recent Labs   Lab 02/16/20  0949 02/17/20  0327   WBC 10.65  --    HGB 12.3  --    HCT 38.2  --      --    MCV 95  --    RDW 13.3  --     138   K 3.8 4.2    104   CO2 26 25   BUN 10 13   CREATININE 0.91 1.0   GLU 97 143*   PROT 7.5  --    ALBUMIN 4.0  --    BILITOT 0.3  --    AST 19  --    ALKPHOS 77  --    ALT 14  --        Microbiology Data Reviewed: yes  Pertinent Findings:  Flu A/B Negative    Other Results:  EKG (my interpretation): no new    Radiology Data Reviewed: yes  Pertinent Findings:  No new    Current Medications:     Infusions:       Scheduled:   acetaminophen  1,000 mg Oral Once    albuterol-ipratropium  3 mL Nebulization Q4H WAKE    ALPRAZolam  1 mg Oral TID    dicyclomine  20 mg Oral QID    enoxaparin  40 mg Subcutaneous Daily    fluticasone furoate-vilanterol  1 puff Inhalation Daily    furosemide  40 mg Oral Daily    levoFLOXacin  750 mg Intravenous Q24H    levothyroxine  125 mcg Oral Daily    meclizine  25 mg Oral Once    montelukast  10 mg Oral Daily    pantoprazole  40 mg Oral Daily    predniSONE  40 mg Oral Daily        PRN:      Antibiotics and Day Number of Therapy:  Levaquin 2/16-current    Lines and Day Number of Therapy:  PIV    Assessment:     Shilpa Christopher is a 84 y.o.female with  Patient Active Problem List    Diagnosis Date Noted    Dizziness 02/17/2020    Shortness of breath 02/16/2020    COPD exacerbation 02/16/2020    Anxiety 02/16/2020    COPD (chronic obstructive pulmonary disease) with acute bronchitis 03/11/2018    Asthma 03/02/2018    Morbidly obese 03/02/2018    Plantar fasciitis 03/02/2018    Gastroesophageal reflux disease 03/11/2017    Bilateral edema of lower extremity 03/11/2017    Thyroid disease 11/02/2015        Plan:     Chronic COPD with acute exacerbation   -productive cough with shortness of breath x 2 days, improved with duonebs and steroids at [facility]  -levofloxacin 750 daily x5 days  -solumedrol 125, cont  pred daily x 4 days  -not on home O2, placed on 2L NC, weaned to RA      Bilateral lower extremity edema  -chronic: 1+BLLE on exam   -continue home Lasix daily     Generalized anxiety  -patient on long-term Xanax 1mg TID daily     Hypothyroidism  -continue home levothyroxine     GERD  -continue home PPI    Dizziness  -meclizine PRN    Morbid obesity with physical deconditioning  -patient counseled on diet and exercise  - PT/OT ordered  -tylenol prn leg and back pain      Code: Full  Diet: regular  DVT/GI PPX: lovenox, protonix  Dispo: pending oxygen wean and symptomatic improvement with likely discharge later today with  referral     Namrata Fleming MD  IM/EM PGY3  [facility] Internal Medicine Service Team A    [facility] Medicine Hospitalist Pager numbers:   [facility] Hospitalist Medicine Team A (Kerry/Nilsa): [phone]  [facility] Hospitalist Medicine Team B (Steph/Ysabel):  [phone]

## 2020-02-17 NOTE — PLAN OF CARE
Patient safety maintained. Telemetry monitoring initiated. Bed alarm in use, call bell at reach. Medications administered per order. Patient up with assistance to bathroom.

## 2020-02-17 NOTE — CHAPLAIN
Visited with patient and her two sons at bedside.  She was open to Advance Directives discussion.  I gave a copy to patient and one to a son.  Patient said she will complete it with her daughter's assistance.  Visit was pleasant.

## 2020-02-17 NOTE — PLAN OF CARE
met with patient and her son Benedict Christopher, [phone], to complete discharge assessment. Patient was alert and oriented. Patient verified demographics. Patient lives alone. Patient's daughter Eileen Slaughter , [phone], son Benedict, and daughter, Mack Patterson, [phone], are her primary help if needed. Patient also has a neighbor that comes over and helps to clean her home. Patient has medical equipment (cane, grab bars, RW, rollator, high commode) within the home. Patient has no  services. Patient has no issues affording medication. Patient has no social needs at this time.     SW provided patient with discharge brochure and wrote contact information on the whiteboard. SW encouraged patient to contact if any issues or needs arise. Patient verbalized understanding.     02/17/20 1128   Discharge Assessment   Assessment Type Discharge Planning Assessment   Confirmed/corrected address and phone number on facesheet? Yes   Assessment information obtained from? Patient   Prior to hospitilization cognitive status: Alert/Oriented   Current cognitive status: Alert/Oriented   Current Functional Status: Assistive Equipment   Lives With alone   Able to Return to Prior Arrangements yes   Who are your caregiver(s) and their phone number(s)? Benedict Christopher, son, [phone], Mack Patterson, [phone](daughter), and Eileen Slaughter, daughter, [phone]   Readmission Within the Last 30 Days no previous admission in last 30 days   Patient currently being followed by outpatient case management? No   Patient currently receives any other outside agency services? No   Equipment Currently Used at Home grab bar;walker, rolling;rollator;cane, straight   Do you have any problems affording any of your prescribed medications? No   Is the patient taking medications as prescribed? yes   Does the patient have transportation home? Yes   Transportation Anticipated family or friend will provide   Does the patient  receive services at the [facility]? No   Discharge Plan A Home   Discharge Plan B Home with family   DME Needed Upon Discharge  none   Patient/Family in Agreement with Plan yes

## 2020-02-17 NOTE — PLAN OF CARE
Reported pain to back and BL legs.  Pt reports that she takes 1 mg tylenol at bed time.  Med team ordered one 325 tab:  Pt annoyed.  HR elevated to mid 120 during walk to/from bathroom.      Tele: ST,  HR low 100,  No alarms.     Bed in lowest position, wheels locked, non skid socks, ID band worn, personal items and call bell with in reach, bed alarm set.

## 2020-02-17 NOTE — NURSING
Call placed to the team to request order for max strength tylenol, to treat pain per patient request. MD informed that they will be placing orders.

## 2020-02-18 NOTE — PLAN OF CARE
CM notified by primary team of orders placed on 2/16 for HH.  Copy of orders printed by team, orders inn Epic on this date canceled.  CM sent orders to N for processing and will contact team if new orders needed.  N to assign hh agency.    Alida Kelly RN    [phone]

## 2020-02-18 NOTE — NURSING
Patient being D/C home with son, patient AA)x3, vitals signs stable, able to voice concerns, shows no acute signs of distress, patient and son verbally understands plan of care, IV site remove clean and intact, telemetry monitor removed, patient left via w/c one assist with son to car.

## 2020-02-18 NOTE — PLAN OF CARE
VN note: VN cued into patient's room to review discharge papers. Son at bedside. Son stated ok to review instructions via monitor. Bedside nurse Dain to bring papers. VN educated patient and son on new medication and side effects. Medication list reviewed. Follow-up information given. Bedside Delivery completed. VN educated patient on COPD signs and symptoms and when to seek medical attention. Patient verbalized understanding and all questions answered. Refer to clinical references for further education given. Transport requested.

## 2020-02-18 NOTE — DISCHARGE SUMMARY
[facility] IM Discharge Summary    Primary Team: [facility] Medicine Team A   Attending Physician: Nilsa   Resident: Bernadette   Intern: Amelia    Date of Admit: 2/16/2020  Date of Discharge: 2/17/2020    Discharge to: home  Condition: stable     Discharge Diagnoses     COPD exacerbation     Consultants and Procedures     Consultants:  n/a    Procedures:   n/a    Brief History of Present Illness      Shilpa Christopher is an 85y/o female with a past medical history of COPD, GERD. Anxiety, and hypothyroid presenting with a two day history of shortness of breath and productive cough.      The patient notes non-bloody, productive cough of clear sputum associated with shortness of breath which began three days prior to presentation and had been progressive prompting the patient to visit [facility] ED. The patient denies chest pain, recent travel, recent surgery, and lower extremity tenderness. The patient notes bilateral lower extremity edema at baseline and denies worsening symptoms on the day of presentation. The patient sleeps with one pillow at night and denies orthopnea.  The patient received breathing treatments, steroids, and levofloxacin  at [facility] and reports feeling better at the time of interview. She is was on 2L NC and does not use home oxygen. The patient quit smoking 28 years ago. The patient lives alone and performs all ADLs on her own.       For the full HPI please refer to the History & Physical from this admission.    Hospital Course By Problem with Pertinent Findings     Chronic COPD with acute exacerbation   -productive cough with shortness of breath x 2 days, improved with duonebs and steroids at [facility]  -levofloxacin 750 daily x5 days: continue with remaining 3 day course  -solumedrol 125, cont pred daily x 3days  -not on home O2, placed on 2L NC, weaned to RA prior to discharge      Bilateral lower extremity edema  -chronic: 1+BLLE on exam   -continue home Lasix daily 40mg BID      Generalized anxiety  -patient on  long-term Xanax 1mg TID daily  -continued while inpatient: resume on discharge     Hypothyroidism  -continue home levothyroxine      GERD  -continue home PPI     Dizziness  -continue home Meclizine PRN      Morbid obesity with physical deconditioning  -patient counseled on diet and exercise  - PT/OT ordered  -tylenol prn leg and back pain        Discharge Medications        Medication List      START taking these medications    levoFLOXacin 750 MG tablet  Commonly known as:  LEVAQUIN  Take 1 tablet (750 mg total) by mouth once daily. for 3 days     Mucinex 600 mg 12 hr tablet  Generic drug:  guaiFENesin  Take 1 tablet (600 mg total) by mouth 2 (two) times daily. for 5 days     predniSONE 20 MG tablet  Commonly known as:  DELTASONE  Take 2 tablets (40 mg total) by mouth once daily. for 3 days        CONTINUE taking these medications    Advair Diskus 250-50 mcg/dose diskus inhaler  Generic drug:  fluticasone-salmeterol 250-50 mcg/dose  INHALE 1 PUFF INTO THE LUNGS TWO TIMES A DAY     * albuterol 2.5 mg /3 mL (0.083 %) nebulizer solution  Commonly known as:  PROVENTIL  USE 1 VIAL IN NEBULIZER EVERY 6 HOURS AS NEEDED FOR WHEEZING     * Ventolin HFA 90 mcg/actuation inhaler  Generic drug:  albuterol  INHALE 2 PUFFS INTO THE LUNGS FOUR TIMES A DAY AS NEEDED     ALPRAZolam 1 MG tablet  Commonly known as:  XANAX  TAKE 1 TABLET BY MOUTH THREE TIMES A DAY     Atrovent HFA 17 mcg/actuation inhaler  Generic drug:  ipratropium  INHALE 4 PUFFS FOUR TIMES A DAY     dicyclomine 10 MG capsule  Commonly known as:  BENTYL  Take 2 capsules (20 mg total) by mouth 4 (four) times daily.     furosemide 40 MG tablet  Commonly known as:  LASIX  TAKE 1 TABLET BY MOUTH UP TO TWO TIMES A DAY. OKAY TO TAKE 2 IN THE MORNING WITH FLUID GAIN     levothyroxine 125 MCG tablet  Commonly known as:  SYNTHROID  TAKE 1 TABLET BY MOUTH EVERY DAY     meclizine 25 mg tablet  Commonly known as:  ANTIVERT  TAKE 1 TABLET BY MOUTH THREE TIMES A DAY AS NEEDED      montelukast 10 mg tablet  Commonly known as:  SINGULAIR  TAKE 1 TABLET BY MOUTH EVERY DAY     omeprazole 20 MG capsule  Commonly known as:  PRILOSEC  TAKE 1 CAPSULE BY MOUTH EVERY DAY         * This list has 2 medication(s) that are the same as other medications prescribed for you. Read the directions carefully, and ask your doctor or other care provider to review them with you.            STOP taking these medications    Fluzone High-Dose 2019-20 (PF) 180 mcg/0.5 mL Syrg  Generic drug:  flu vacc [id number](65yr up)PF           Where to Get Your Medications      These medications were sent to [facility] [place]  [address], [place] [address]    Hours:  Mon-Fri, 8a-5:30p Phone:  [phone]   · levoFLOXacin 750 MG tablet  · Mucinex 600 mg 12 hr tablet  · predniSONE 20 MG tablet         Discharge Information:   Diet:  Adult regular     Physical Activity:  As tolerated    Instructions:  1. Take all medications as prescribed  2. Keep all follow-up appointments  3. Return to the hospital or call your primary care physicians if any worsening symptoms such as shortness of breath, chest pain with diaphoresis, nausea/vomitting, productive cough, and fevers.     Follow-Up Appointments:  PCP    Benedict Forbes  [facility] Internal Medicine, [facility]

## 2020-02-19 ENCOUNTER — TELEPHONE (OUTPATIENT)
Dept: FAMILY MEDICINE | Facility: CLINIC | Age: 85
End: 2020-02-19

## 2020-02-19 PROCEDURE — G0180 PR HOME HEALTH MD CERTIFICATION: ICD-10-PCS | Mod: ,,, | Performed by: FAMILY MEDICINE

## 2020-02-19 PROCEDURE — G0180 MD CERTIFICATION HHA PATIENT: HCPCS | Mod: ,,, | Performed by: FAMILY MEDICINE

## 2020-02-19 RX ORDER — ALBUTEROL SULFATE 0.83 MG/ML
2.5 SOLUTION RESPIRATORY (INHALATION) EVERY 6 HOURS PRN
Qty: 1 BOX | Refills: 0 | Status: SHIPPED | OUTPATIENT
Start: 2020-02-19 | End: 2021-02-18

## 2020-02-19 NOTE — TELEPHONE ENCOUNTER
----- Message from Pilar Watters sent at 2/19/2020  9:29 AM CST -----  Contact: Betzaida from [facility]  Betzaida would like a call back to confirm if the doctor would accept home health for patient. Orders where placed in but because Doctor is her PCP needs approval  Please call [phone] to discuss today.       Pt was recently inpatient   She has follow up appt with you on Friday    She has an inhaler but still having trouble    She has an old neb machine from her  but needs meds if you think that she may help her  She used to use the nebulizer  She was using it in the hospital but not sent home on it  Please advise

## 2020-02-21 ENCOUNTER — OFFICE VISIT (OUTPATIENT)
Dept: FAMILY MEDICINE | Facility: CLINIC | Age: 85
End: 2020-02-21
Payer: MEDICARE

## 2020-02-21 VITALS
DIASTOLIC BLOOD PRESSURE: 60 MMHG | TEMPERATURE: 98 F | HEIGHT: 66 IN | BODY MASS INDEX: 39.08 KG/M2 | OXYGEN SATURATION: 94 % | HEART RATE: 108 BPM | WEIGHT: 243.19 LBS | SYSTOLIC BLOOD PRESSURE: 116 MMHG

## 2020-02-21 DIAGNOSIS — Z13.220 NEED FOR LIPID SCREENING: ICD-10-CM

## 2020-02-21 DIAGNOSIS — J20.9 COPD (CHRONIC OBSTRUCTIVE PULMONARY DISEASE) WITH ACUTE BRONCHITIS: Primary | ICD-10-CM

## 2020-02-21 DIAGNOSIS — J45.909 ASTHMA, UNSPECIFIED ASTHMA SEVERITY, UNSPECIFIED WHETHER COMPLICATED, UNSPECIFIED WHETHER PERSISTENT: ICD-10-CM

## 2020-02-21 DIAGNOSIS — J44.0 COPD (CHRONIC OBSTRUCTIVE PULMONARY DISEASE) WITH ACUTE BRONCHITIS: Primary | ICD-10-CM

## 2020-02-21 PROCEDURE — 99213 PR OFFICE/OUTPT VISIT, EST, LEVL III, 20-29 MIN: ICD-10-PCS | Mod: S$GLB,,, | Performed by: FAMILY MEDICINE

## 2020-02-21 PROCEDURE — 1101F PR PT FALLS ASSESS DOC 0-1 FALLS W/OUT INJ PAST YR: ICD-10-PCS | Mod: CPTII,S$GLB,, | Performed by: FAMILY MEDICINE

## 2020-02-21 PROCEDURE — 1101F PT FALLS ASSESS-DOCD LE1/YR: CPT | Mod: CPTII,S$GLB,, | Performed by: FAMILY MEDICINE

## 2020-02-21 PROCEDURE — 1159F PR MEDICATION LIST DOCUMENTED IN MEDICAL RECORD: ICD-10-PCS | Mod: S$GLB,,, | Performed by: FAMILY MEDICINE

## 2020-02-21 PROCEDURE — 99213 OFFICE O/P EST LOW 20 MIN: CPT | Mod: S$GLB,,, | Performed by: FAMILY MEDICINE

## 2020-02-21 PROCEDURE — 1159F MED LIST DOCD IN RCRD: CPT | Mod: S$GLB,,, | Performed by: FAMILY MEDICINE

## 2020-02-21 NOTE — PROGRESS NOTES
" Patient ID: Shilpa Christopher is a 84 y.o. female.    Chief Complaint: Hospital Follow Up    HPI      Shilpa Christopher is a 84 y.o. female here following up after COPD exacerbation.  Feeling much better at this time.  Occasional wheezing mostly relieved by p.r.n. medication.  Continues to take chronic medication including Advair.    Vitals:    02/21/20 1013   BP: 116/60   Pulse: 108   Temp: 98.2 °F (36.8 °C)   SpO2: (!) 94%   Weight: 110.3 kg (243 lb 2.7 oz)   Height: 5' 6" (1.676 m)            Review of Symptoms    Constitutional activity improving, No chills /fever   Resp  Neg hemoptysis, stridor, choking  CVS  Neg chest pain, palpitations    Physical Exam    Constitutional:  Oriented to person, place, and time.appears well-developed and well-nourished.  No distress.      HENT  Head: Normocephalic and atraumatic  Right Ear: External ear normal.   Left Ear: External ear normal.   Nose: External nose normal.   Mouth:  Moist mucus membranes.    Eyes:  Conjunctivae are normal. Right eye exhibits no discharge.  Left eye exhibits no discharge. No scleral icterus.  No periorbital edema  Blind    Cardiovascular:  Regular rate and rhythm with normal S1 and S2     Pulmonary/Chest:   Clear to auscultation bilaterally without wheezes, rhonchi or rales      Musculoskeletal:  No edema. No obvious deformity No wasting  Uses walker     Neurological:  Alert and oriented to person, place, and time.   Coordination normal.     Skin:   Skin is warm and dry.  No diaphoresis.   No rash noted.     Psychiatric: Normal mood and affect. Behavior is normal.  Judgment and thought content normal.     Complete Blood Count  Lab Results   Component Value Date    RBC 4.02 02/16/2020    HGB 12.3 02/16/2020    HCT 38.2 02/16/2020    MCV 95 02/16/2020    MCH 30.6 02/16/2020    MCHC 32.2 02/16/2020    RDW 13.3 02/16/2020     02/16/2020    MPV 9.2 02/16/2020    GRAN 8.2 (H) 02/16/2020    GRAN 76.5 (H) 02/16/2020    LYMPH 1.4 02/16/2020    " LYMPH 13.1 (L) 02/16/2020    MONO 0.8 02/16/2020    MONO 7.7 02/16/2020    EOS 0.2 02/16/2020    BASO 0.07 02/16/2020    EOSINOPHIL 1.7 02/16/2020    BASOPHIL 0.7 02/16/2020    DIFFMETHOD Automated 02/16/2020       Comprehensive Metabolic Panel  Lab Results   Component Value Date     (H) 02/17/2020    BUN 13 02/17/2020    CREATININE 1.0 02/17/2020     02/17/2020    K 4.2 02/17/2020     02/17/2020    PROT 7.5 02/16/2020    ALBUMIN 4.0 02/16/2020    BILITOT 0.3 02/16/2020    AST 19 02/16/2020    ALKPHOS 77 02/16/2020    CO2 25 02/17/2020    ALT 14 02/16/2020    ANIONGAP 9 02/17/2020    EGFRNONAA 52 (A) 02/17/2020    ESTGFRAFRICA 60 02/17/2020       TSH  No results found for: TSH    Assessment / Plan:      ICD-10-CM ICD-9-CM   1. COPD (chronic obstructive pulmonary disease) with acute bronchitis J44.0 491.22    J20.9    2. Need for lipid screening Z13.220 V77.91   3. Asthma, unspecified asthma severity, unspecified whether complicated, unspecified whether persistent J45.909 493.90     COPD (chronic obstructive pulmonary disease) with acute bronchitis    Need for lipid screening    Asthma, unspecified asthma severity, unspecified whether complicated, unspecified whether persistent  -     NEBULIZER FOR HOME USE

## 2020-02-26 ENCOUNTER — TELEPHONE (OUTPATIENT)
Dept: FAMILY MEDICINE | Facility: CLINIC | Age: 85
End: 2020-02-26

## 2020-02-26 NOTE — TELEPHONE ENCOUNTER
Spoke with HH; I advised patient's HH will be followed by Dr. Hernandez.  No further action needed at this time.      ----- Message from Lei Marsh sent at 2/26/2020  9:59 AM CST -----  Contact: Maria Eugenia morris [facility] / [phone]   Maria Eugenia would like to speak with your office regarding following the patient on her home health services. Please Advise.

## 2020-03-02 ENCOUNTER — TELEPHONE (OUTPATIENT)
Dept: FAMILY MEDICINE | Facility: CLINIC | Age: 85
End: 2020-03-02

## 2020-03-02 RX ORDER — IPRATROPIUM BROMIDE 17 UG/1
AEROSOL, METERED RESPIRATORY (INHALATION)
Qty: 12.9 INHALER | Refills: 2 | Status: SHIPPED | OUTPATIENT
Start: 2020-03-02 | End: 2020-04-22

## 2020-03-02 RX ORDER — ALBUTEROL SULFATE 90 UG/1
2 AEROSOL, METERED RESPIRATORY (INHALATION) EVERY 6 HOURS PRN
Qty: 18 G | Refills: 1 | Status: SHIPPED | OUTPATIENT
Start: 2020-03-02

## 2020-03-12 ENCOUNTER — EXTERNAL HOME HEALTH (OUTPATIENT)
Dept: HOME HEALTH SERVICES | Facility: HOSPITAL | Age: 85
End: 2020-03-12
Payer: MEDICARE

## 2020-03-12 RX ORDER — ALPRAZOLAM 1 MG/1
TABLET ORAL
Qty: 90 TABLET | Refills: 0 | Status: SHIPPED | OUTPATIENT
Start: 2020-03-12 | End: 2020-04-06

## 2020-03-12 RX ORDER — DICYCLOMINE HYDROCHLORIDE 10 MG/1
CAPSULE ORAL
Qty: 720 CAPSULE | Refills: 1 | Status: SHIPPED | OUTPATIENT
Start: 2020-03-12 | End: 2020-08-31

## 2020-04-06 RX ORDER — ALPRAZOLAM 1 MG/1
TABLET ORAL
Qty: 90 TABLET | Refills: 0 | Status: SHIPPED | OUTPATIENT
Start: 2020-04-06 | End: 2020-05-08

## 2020-04-06 RX ORDER — FUROSEMIDE 40 MG/1
TABLET ORAL
Qty: 90 TABLET | Refills: 1 | Status: SHIPPED | OUTPATIENT
Start: 2020-04-06 | End: 2020-09-29

## 2020-04-13 ENCOUNTER — TELEPHONE (OUTPATIENT)
Dept: FAMILY MEDICINE | Facility: CLINIC | Age: 85
End: 2020-04-13

## 2020-04-13 NOTE — TELEPHONE ENCOUNTER
----- Message from Zenia Macias sent at 4/13/2020  1:15 PM CDT -----  Contact: [phone]'s daughter Eileen  Patient's daughter requesting to speak with you regarding pt's medication ALPRAZolam (XANAX) 1 MG tablet. Please advise.

## 2020-04-22 RX ORDER — IPRATROPIUM BROMIDE 17 UG/1
AEROSOL, METERED RESPIRATORY (INHALATION)
Qty: 12.9 INHALER | Refills: 2 | Status: SHIPPED | OUTPATIENT
Start: 2020-04-22 | End: 2020-06-18

## 2020-05-08 RX ORDER — OMEPRAZOLE 20 MG/1
CAPSULE, DELAYED RELEASE ORAL
Qty: 90 CAPSULE | Refills: 0 | Status: SHIPPED | OUTPATIENT
Start: 2020-05-08 | End: 2020-07-27

## 2020-05-08 RX ORDER — ALPRAZOLAM 1 MG/1
TABLET ORAL
Qty: 90 TABLET | Refills: 1 | Status: SHIPPED | OUTPATIENT
Start: 2020-05-08 | End: 2020-07-10

## 2020-05-11 RX ORDER — ALBUTEROL SULFATE 90 UG/1
AEROSOL, METERED RESPIRATORY (INHALATION)
Qty: 18 G | Refills: 11 | Status: SHIPPED | OUTPATIENT
Start: 2020-05-11

## 2020-06-18 RX ORDER — IPRATROPIUM BROMIDE 17 UG/1
AEROSOL, METERED RESPIRATORY (INHALATION)
Qty: 12.9 INHALER | Refills: 2 | Status: SHIPPED | OUTPATIENT
Start: 2020-06-18 | End: 2020-07-31

## 2020-07-27 RX ORDER — OMEPRAZOLE 20 MG/1
CAPSULE, DELAYED RELEASE ORAL
Qty: 90 CAPSULE | Refills: 0 | Status: SHIPPED | OUTPATIENT
Start: 2020-07-27 | End: 2020-10-23

## 2020-07-31 RX ORDER — IPRATROPIUM BROMIDE 17 UG/1
AEROSOL, METERED RESPIRATORY (INHALATION)
Qty: 12.9 INHALER | Refills: 2 | Status: SHIPPED | OUTPATIENT
Start: 2020-07-31 | End: 2020-09-19

## 2020-08-31 RX ORDER — DICYCLOMINE HYDROCHLORIDE 10 MG/1
CAPSULE ORAL
Qty: 720 CAPSULE | Refills: 1 | Status: SHIPPED | OUTPATIENT
Start: 2020-08-31

## 2020-08-31 RX ORDER — ALPRAZOLAM 1 MG/1
TABLET ORAL
Qty: 90 TABLET | Refills: 3 | Status: SHIPPED | OUTPATIENT
Start: 2020-08-31 | End: 2020-09-08

## 2020-09-08 RX ORDER — ALPRAZOLAM 1 MG/1
TABLET ORAL
Qty: 90 TABLET | Refills: 3 | Status: SHIPPED | OUTPATIENT
Start: 2020-09-08 | End: 2021-01-02

## 2020-09-29 RX ORDER — FUROSEMIDE 40 MG/1
TABLET ORAL
Qty: 90 TABLET | Refills: 1 | Status: SHIPPED | OUTPATIENT
Start: 2020-09-29

## 2020-10-23 RX ORDER — OMEPRAZOLE 20 MG/1
CAPSULE, DELAYED RELEASE ORAL
Qty: 90 CAPSULE | Refills: 0 | Status: SHIPPED | OUTPATIENT
Start: 2020-10-23

## 2020-11-02 RX ORDER — IPRATROPIUM BROMIDE 17 UG/1
AEROSOL, METERED RESPIRATORY (INHALATION)
Qty: 12.9 INHALER | Refills: 2 | Status: SHIPPED | OUTPATIENT
Start: 2020-11-02 | End: 2020-12-16

## 2020-12-16 RX ORDER — IPRATROPIUM BROMIDE 17 UG/1
AEROSOL, METERED RESPIRATORY (INHALATION)
Qty: 12.9 INHALER | Refills: 2 | Status: SHIPPED | OUTPATIENT
Start: 2020-12-16 | End: 2020-12-31 | Stop reason: SDUPTHER

## 2020-12-30 ENCOUNTER — HOSPITAL ENCOUNTER (EMERGENCY)
Facility: HOSPITAL | Age: 85
Discharge: HOME | End: 2020-12-30
Attending: EMERGENCY MEDICINE
Payer: MEDICARE

## 2020-12-30 VITALS
RESPIRATION RATE: 20 BRPM | DIASTOLIC BLOOD PRESSURE: 70 MMHG | WEIGHT: 248 LBS | TEMPERATURE: 100 F | SYSTOLIC BLOOD PRESSURE: 138 MMHG | HEART RATE: 107 BPM | HEIGHT: 66 IN | BODY MASS INDEX: 39.86 KG/M2 | OXYGEN SATURATION: 93 %

## 2020-12-30 DIAGNOSIS — J44.0 COPD (CHRONIC OBSTRUCTIVE PULMONARY DISEASE) WITH ACUTE BRONCHITIS: Primary | ICD-10-CM

## 2020-12-30 DIAGNOSIS — J20.9 COPD (CHRONIC OBSTRUCTIVE PULMONARY DISEASE) WITH ACUTE BRONCHITIS: Primary | ICD-10-CM

## 2020-12-30 DIAGNOSIS — Z20.822 SUSPECTED COVID-19 VIRUS INFECTION: ICD-10-CM

## 2020-12-30 DIAGNOSIS — U07.1 COVID-19: Primary | ICD-10-CM

## 2020-12-30 DIAGNOSIS — U07.1 COVID-19: ICD-10-CM

## 2020-12-30 DIAGNOSIS — J44.9 CHRONIC OBSTRUCTIVE PULMONARY DISEASE, UNSPECIFIED COPD TYPE: ICD-10-CM

## 2020-12-30 LAB
ALBUMIN SERPL BCP-MCNC: 4.2 G/DL (ref 3.5–5.2)
ALP SERPL-CCNC: 89 U/L (ref 38–126)
ALT SERPL W/O P-5'-P-CCNC: 15 U/L (ref 10–44)
ANION GAP SERPL CALC-SCNC: 9 MMOL/L (ref 8–16)
AST SERPL-CCNC: 23 U/L (ref 15–46)
BASOPHILS # BLD AUTO: 0.04 K/UL (ref 0–0.2)
BASOPHILS NFR BLD: 0.5 % (ref 0–1.9)
BILIRUB SERPL-MCNC: 0.5 MG/DL (ref 0.1–1)
CALCIUM SERPL-MCNC: 8.7 MG/DL (ref 8.7–10.5)
CHLORIDE SERPL-SCNC: 100 MMOL/L (ref 95–110)
CK SERPL-CCNC: 91 U/L (ref 55–170)
CO2 SERPL-SCNC: 28 MMOL/L (ref 23–29)
CREAT SERPL-MCNC: 0.89 MG/DL (ref 0.5–1.4)
CRP SERPL-MCNC: 4.51 MG/DL (ref 0–1)
DIFFERENTIAL METHOD: ABNORMAL
EOSINOPHIL # BLD AUTO: 0 K/UL (ref 0–0.5)
EOSINOPHIL NFR BLD: 0.4 % (ref 0–8)
ERYTHROCYTE [DISTWIDTH] IN BLOOD BY AUTOMATED COUNT: 13.4 % (ref 11.5–14.5)
EST. GFR  (AFRICAN AMERICAN): >60 ML/MIN/1.73 M^2
EST. GFR  (NON AFRICAN AMERICAN): 59.3 ML/MIN/1.73 M^2
FERRITIN SERPL-MCNC: 84 NG/ML (ref 20–300)
GLUCOSE SERPL-MCNC: 123 MG/DL (ref 70–110)
HCT VFR BLD AUTO: 39 % (ref 37–48.5)
HGB BLD-MCNC: 12.5 G/DL (ref 12–16)
IMM GRANULOCYTES # BLD AUTO: 0.02 K/UL (ref 0–0.04)
IMM GRANULOCYTES NFR BLD AUTO: 0.3 % (ref 0–0.5)
LACTATE SERPL-SCNC: 1 MMOL/L (ref 0.5–2.2)
LDH SERPL L TO P-CCNC: 227 U/L (ref 110–260)
LYMPHOCYTES # BLD AUTO: 0.5 K/UL (ref 1–4.8)
LYMPHOCYTES NFR BLD: 7 % (ref 18–48)
MCH RBC QN AUTO: 30 PG (ref 27–31)
MCHC RBC AUTO-ENTMCNC: 32.1 G/DL (ref 32–36)
MCV RBC AUTO: 94 FL (ref 82–98)
MONOCYTES # BLD AUTO: 1.1 K/UL (ref 0.3–1)
MONOCYTES NFR BLD: 14.7 % (ref 4–15)
NEUTROPHILS # BLD AUTO: 5.6 K/UL (ref 1.8–7.7)
NEUTROPHILS NFR BLD: 77.1 % (ref 38–73)
NRBC BLD-RTO: 0 /100 WBC
NT-PROBNP SERPL-MCNC: 1630 PG/ML (ref 5–1800)
PLATELET # BLD AUTO: 227 K/UL (ref 150–350)
PMV BLD AUTO: 9.2 FL (ref 9.2–12.9)
POTASSIUM SERPL-SCNC: 3.8 MMOL/L (ref 3.5–5.1)
PROT SERPL-MCNC: 7.6 G/DL (ref 6–8.4)
RBC # BLD AUTO: 4.17 M/UL (ref 4–5.4)
SARS-COV-2 RDRP RESP QL NAA+PROBE: POSITIVE
SODIUM SERPL-SCNC: 137 MMOL/L (ref 136–145)
TROPONIN I SERPL-MCNC: 0.03 NG/ML (ref 0.01–0.03)
UUN UR-MCNC: 12 MG/DL (ref 7–17)
WBC # BLD AUTO: 7.28 K/UL (ref 3.9–12.7)

## 2020-12-30 PROCEDURE — 93010 EKG 12-LEAD: ICD-10-PCS | Mod: ,,, | Performed by: INTERNAL MEDICINE

## 2020-12-30 PROCEDURE — 82550 ASSAY OF CK (CPK): CPT | Mod: ER

## 2020-12-30 PROCEDURE — 84484 ASSAY OF TROPONIN QUANT: CPT | Mod: ER

## 2020-12-30 PROCEDURE — 93010 ELECTROCARDIOGRAM REPORT: CPT | Mod: ,,, | Performed by: INTERNAL MEDICINE

## 2020-12-30 PROCEDURE — 99285 EMERGENCY DEPT VISIT HI MDM: CPT | Mod: 25,ER

## 2020-12-30 PROCEDURE — 86140 C-REACTIVE PROTEIN: CPT | Mod: ER

## 2020-12-30 PROCEDURE — U0002 COVID-19 LAB TEST NON-CDC: HCPCS | Mod: ER

## 2020-12-30 PROCEDURE — 85025 COMPLETE CBC W/AUTO DIFF WBC: CPT | Mod: ER

## 2020-12-30 PROCEDURE — 80053 COMPREHEN METABOLIC PANEL: CPT | Mod: ER

## 2020-12-30 PROCEDURE — 83605 ASSAY OF LACTIC ACID: CPT | Mod: ER

## 2020-12-30 PROCEDURE — 83880 ASSAY OF NATRIURETIC PEPTIDE: CPT | Mod: ER

## 2020-12-30 PROCEDURE — 93005 ELECTROCARDIOGRAM TRACING: CPT | Mod: ER

## 2020-12-30 PROCEDURE — 83615 LACTATE (LD) (LDH) ENZYME: CPT

## 2020-12-30 PROCEDURE — 87040 BLOOD CULTURE FOR BACTERIA: CPT | Mod: ER

## 2020-12-30 PROCEDURE — 82728 ASSAY OF FERRITIN: CPT

## 2020-12-31 RX ORDER — IPRATROPIUM BROMIDE 17 UG/1
AEROSOL, METERED RESPIRATORY (INHALATION)
Qty: 12.9 INHALER | Refills: 2 | Status: SHIPPED | OUTPATIENT
Start: 2020-12-31

## 2020-12-31 RX ORDER — IPRATROPIUM BROMIDE 17 UG/1
AEROSOL, METERED RESPIRATORY (INHALATION)
Qty: 12.9 INHALER | Refills: 2 | Status: CANCELLED | OUTPATIENT
Start: 2020-12-31

## 2021-01-01 ENCOUNTER — NURSE TRIAGE (OUTPATIENT)
Dept: ADMINISTRATIVE | Facility: CLINIC | Age: 86
End: 2021-01-01

## 2021-01-01 ENCOUNTER — TELEPHONE (OUTPATIENT)
Dept: ADMINISTRATIVE | Facility: CLINIC | Age: 86
End: 2021-01-01

## 2021-01-04 LAB
BACTERIA BLD CULT: NORMAL
BACTERIA BLD CULT: NORMAL

## 2021-01-15 ENCOUNTER — PATIENT OUTREACH (OUTPATIENT)
Dept: ADMINISTRATIVE | Facility: CLINIC | Age: 86
End: 2021-01-15

## 2021-01-15 ENCOUNTER — EXTERNAL HOSPITAL ADMISSION (OUTPATIENT)
Dept: ADMINISTRATIVE | Facility: CLINIC | Age: 86
End: 2021-01-15

## 2021-02-17 NOTE — DISCHARGE INSTRUCTIONS
Chronic Obstructive Pulmonary Disease (COPD), Discharge Instructions (English) View Edit Remove   COPD Flare (English) View Edit Remove   COPD, Treatment for (English) View Edit Remove   Levofloxacin tablets (English) View Edit Remove   Guaifenesin oral ER tablets (English) View Edit Remove   Prednisone tablets (English) View Edit Remove        Soft, non-tender, no hepatosplenomegaly, normal bowel sounds negative

## 2021-04-05 ENCOUNTER — PATIENT MESSAGE (OUTPATIENT)
Dept: ADMINISTRATIVE | Facility: HOSPITAL | Age: 86
End: 2021-04-05

## 2021-07-06 ENCOUNTER — PATIENT MESSAGE (OUTPATIENT)
Dept: ADMINISTRATIVE | Facility: HOSPITAL | Age: 86
End: 2021-07-06

## 2023-01-22 NOTE — TELEPHONE ENCOUNTER
----- Message from Georgina Garcia sent at 3/2/2020  4:01 PM CST -----  Recvd mail correspondence from [facility].  Albuterol AER HFA is not covered.       Alternatives suggested by [facility]  Proair HFA ; or Proair Respiclick     You have a bacterial infection called bacterial vaginosis. Take your medication as indicated and prescribed. Make sure you get the prescription filled and take the antibiotics until finished. Drink plenty of water while taking the antibiotics. Avoid drinking alcohol or drinks that have caffeine in it while taking antibiotics. For pain use acetaminophen (Tylenol) or ibuprofen (Motrin / Advil), unless prescribed medications that have acetaminophen or ibuprofen (or similar medications) in it. You can take over the counter acetaminophen tablets (1 - 2 tablets of the 500-mg strength every 6 hours) or ibuprofen tablets (2 tablets every 4 hours). Do not have any sexual intercourse until the test results are completed in 2 - 3 days. If your results come back positive for a STD then make sure that any of your sexual partners are treated before having intercourse with them. The primary means of preventing STD transmission is with the use of condoms. PLEASE RETURN TO THE EMERGENCY DEPARTMENT IMMEDIATELY for worsening symptoms, pain with urination, worsening vaginal discharge, or if you develop any concerning symptoms such as: high fever not relieved by acetaminophen (Tylenol) and/or ibuprofen (Motrin / Advil), chills, shortness of breath, chest pain, feeling of your heart fluttering or racing, persistent nausea and/or vomiting, vomiting up blood, blood in your stool, loss of consciousness, numbness, weakness or tingling in the arms or legs or change in color of the extremities, changes in mental status, persistent headache, blurry vision loss of bladder / bowel control, unable to follow up with your physician, or other any other care or concern.

## 2025-05-01 NOTE — TELEPHONE ENCOUNTER
Addended by: ALVINO GORDON on: 5/1/2025 11:05 AM     Modules accepted: Orders     I spoke with the pt   She twisted wrong today and having a lot of pain    She is requesting a muscle relaxer.   Please send to [facility]